# Patient Record
Sex: FEMALE | Race: OTHER | Employment: UNEMPLOYED | URBAN - METROPOLITAN AREA
[De-identification: names, ages, dates, MRNs, and addresses within clinical notes are randomized per-mention and may not be internally consistent; named-entity substitution may affect disease eponyms.]

---

## 2017-08-23 ENCOUNTER — OFFICE VISIT (OUTPATIENT)
Dept: FAMILY MEDICINE CLINIC | Age: 27
End: 2017-08-23

## 2017-08-23 VITALS
SYSTOLIC BLOOD PRESSURE: 109 MMHG | HEIGHT: 63 IN | OXYGEN SATURATION: 97 % | HEART RATE: 89 BPM | WEIGHT: 179 LBS | BODY MASS INDEX: 31.71 KG/M2 | TEMPERATURE: 98.9 F | DIASTOLIC BLOOD PRESSURE: 72 MMHG

## 2017-08-23 DIAGNOSIS — R05.8 DRY COUGH: Primary | ICD-10-CM

## 2017-08-23 RX ORDER — BENZONATATE 100 MG/1
100 CAPSULE ORAL
Qty: 15 CAP | Refills: 0
Start: 2017-08-23 | End: 2018-01-29

## 2017-08-23 RX ORDER — ALBUTEROL SULFATE 0.83 MG/ML
2.5 SOLUTION RESPIRATORY (INHALATION) ONCE
Qty: 1 EACH | Refills: 0
Start: 2017-08-23 | End: 2017-08-23

## 2017-08-23 NOTE — MR AVS SNAPSHOT
Visit Information Date & Time Provider Department Dept. Phone Encounter #  
 8/23/2017  1:30 PM Cb Fairbanks NP AN-ST [facility] [phone] [id number] Upcoming Health Maintenance Date Due  
 HPV AGE 9Y-34Y (1 of 3 - Female 3 Dose Series) 9/21/2002 DTaP/Tdap/Td series (1 - Tdap) 9/21/2012 PAP AKA CERVICAL CYTOLOGY 9/21/2012 INFLUENZA AGE 9 TO ADULT 8/1/2017 Allergies as of 8/23/2017  Review Complete On: 8/23/2017 By: Cb Fairbanks NP No Known Allergies Current Immunizations  Never Reviewed Name Date Influenza Vaccine (Quad) PF 12/15/2014 Not reviewed this visit You Were Diagnosed With   
  
 Codes Comments Dry cough    -  Primary ICD-10-CM: Q93 ICD-9-CM: 169. 2 Vitals BP Pulse Temp Height(growth percentile) Weight(growth percentile) LMP  
 109/72 (BP 1 Location: Right arm, BP Patient Position: Sitting) 89 98.9 °F (37.2 °C) (Oral) 5' 2.6\" (1.59 m) 179 lb (81.2 kg) 08/10/2017 SpO2 Breastfeeding? BMI OB Status Smoking Status 97% No 32.12 kg/m2 Having regular periods Never Smoker Vitals History BMI and BSA Data Body Mass Index Body Surface Area  
 32.12 kg/m 2 1.89 m 2 Preferred Pharmacy Pharmacy Name Phone [facility] [address] , [address] [place] [phone] Your Updated Medication List  
  
   
This list is accurate as of: 8/23/17  4:16 PM.  Always use your most recent med list.  
  
  
  
  
 albuterol 2.5 mg /3 mL (0.083 %) nebulizer solution Commonly known as:  PROVENTIL VENTOLIN  
3 mL by Nebulization route once for 1 dose. folic acid 1 mg tablet Commonly known as:  Lacy Fieldton Take 1 Tab by mouth daily. We Performed the Following ALBUTEROL, INHAL. SOL., FDA-APPROVED FINAL, NON-COMPOUND UNIT DOSE, 1 MG [ [facility]] INHAL RX, AIRWAY OBST/DX SPUTUM INDUCT [id number] CPT(R)] Introducing [facility] & HEALTH SERVICES! Dear Mendel Marianna: Thank you for requesting a MyQuoteApp account. Our records indicate that you already have an active MyQuoteApp account. You can access your account anytime at [email or web address] Did you know that you can access your hospital and ER discharge instructions at any time in MyQuoteApp? You can also review all of your test results from your hospital stay or ER visit. Additional Information If you have questions, please visit the Frequently Asked Questions section of the MyQuoteApp website at [email or web address] Remember, MyQuoteApp is NOT to be used for urgent needs. For medical emergencies, dial 911. Now available from your iPhone and Android! Please provide this summary of care documentation to your next provider. If you have any questions after today's visit, please call [phone].

## 2017-08-23 NOTE — PROGRESS NOTES
Coordination of Care  1. Have you been to the ER, urgent care clinic since your last visit? Hospitalized since your last visit? No    2. Have you seen or consulted any other health care providers outside of the [address] [place] [address] since your last visit? Include any pap smears or colon screening. No    Medications  Medication Reconciliation Performed: no  Patient does not need refills     Learning Assessment Complete?  yes

## 2017-08-23 NOTE — PROGRESS NOTES
As ordered, patient given one albuterol neb breathing treatment. Pre-neb RA O2 sat was 99%, HR 76 and lungs CTA in all fields. Post-neb RA O2 sat was 100%and HR 99. The patient stated she felt the same after the treatment. She did not cough at all while in the discharge room. Patient tolerated the treatment without difficulty.  Doe Ch RN

## 2017-08-23 NOTE — PROGRESS NOTES
Assessment/Plan:       ICD-10-CM ICD-9-CM    1. Dry cough R05 786.2 albuterol (PROVENTIL VENTOLIN) 2.5 mg /3 mL (0.083 %) nebulizer solution      ALBUTEROL, INHAL. SOL., FDA-APPROVED FINAL, NON-COMPOUND UNIT DOSE, 1 MG      INHAL RX, AIRWAY OBST/DX SPUTUM INDUCT      benzonatate (TESSALON) 100 mg capsule       [address] [facility]  Phone: [phone] Fax: [phone]    [facility] [address], [address] [facility]  [address] [facility]  [address] [place] [address]  Phone: [phone] Fax: [phone]    [facility], 1200 [facility]  [address] [address]  Phone: [phone] Fax: [phone]      [facility]  Subjective:     Chief Complaint   Patient presents with    Cough     x 1 month white phlem more at night     Loren Carpio is a 23 y.o. OTHER female. HPI: No history of asthma. The cough is worse at night. She  has no past medical history on file. She  does not have any active problems on file. Review of Systems: Negative for: fever, chest pain, shortness of breath, leg swelling, exertional dyspnea, palpitations. Current Medications:   Current Outpatient Prescriptions on File Prior to Visit   Medication Sig    folic acid (FOLVITE) 1 mg tablet Take 1 Tab by mouth daily. No current facility-administered medications on file prior to visit. Past Surgical History: She  has no past surgical history on file. Social and Family History: She  reports that she has never smoked. She has never used smokeless tobacco. She reports that she does not drink alcohol or use illicit drugs. ; family history is not on file.   Objective:     Vitals:    08/23/17 1340   BP: 109/72   Pulse: 89   Temp: 98.9 °F (37.2 °C)   TempSrc: Oral   SpO2: 97%   Weight: 179 lb (81.2 kg)   Height: 5' 2.6\" (1.59 m)    Patient's last menstrual period was 08/10/2017. Wt Readings from Last 2 Encounters:   08/23/17 179 lb (81.2 kg)   03/14/16 177 lb (80.3 kg)     No results found for any visits on 08/23/17. Physical Examination:  General appearance - well developed, no acute distress. Chest - clear to auscultation. Heart - regular rate and rhythm without murmurs, rubs, or gallops. Abdomen - bowel sounds present x 4, NT, ND. Extremities - pulses intact. No peripheral edema. Assessment/Plan:   Diagnoses and all orders for this visit:    1. Dry cough  -     albuterol (PROVENTIL VENTOLIN) 2.5 mg /3 mL (0.083 %) nebulizer solution; 3 mL by Nebulization route once for 1 dose. -     ALBUTEROL, INHAL. OII.()  -     INHAL RX, AIRWAY OBST/DX SPUTUM INDUCT ([id number])  -     benzonatate (TESSALON) 100 mg capsule; Take 1 Cap by mouth nightly as needed for Cough. Reports no benefit from nebulizer treatment. Will provide tessalon perles for cough, return if not improving. Follow-up Disposition:  Return if symptoms worsen or fail to improve. Thierno Dallas, MARIAH, FNP-BC, BC-ADM  Loren Olmos expressed understanding of this plan.

## 2018-01-29 ENCOUNTER — OFFICE VISIT (OUTPATIENT)
Dept: FAMILY MEDICINE CLINIC | Age: 28
End: 2018-01-29

## 2018-01-29 VITALS
BODY MASS INDEX: 33.13 KG/M2 | WEIGHT: 187 LBS | HEART RATE: 65 BPM | DIASTOLIC BLOOD PRESSURE: 72 MMHG | HEIGHT: 63 IN | TEMPERATURE: 97.8 F | SYSTOLIC BLOOD PRESSURE: 113 MMHG

## 2018-01-29 DIAGNOSIS — R68.89 FLU-LIKE SYMPTOMS: ICD-10-CM

## 2018-01-29 DIAGNOSIS — Z13.9 ENCOUNTER FOR SCREENING: Primary | ICD-10-CM

## 2018-01-29 LAB — HGB BLD-MCNC: 13.6 G/DL

## 2018-01-29 NOTE — PROGRESS NOTES
Assessment/Plan:    Diagnoses and all orders for this visit:    1. Encounter for screening  -     AMB POC HEMOGLOBIN (HGB)    2. Flu-like symptoms    IUD in place with normal period 1/4/18  No post partum depression sxs  Rest, fluids, tylenol or advil as needed   Flu precautions    Follow-up Disposition:  Return if symptoms worsen or fail to improve. PELON Brock expressed understanding of this plan. An AVS was printed and given to the patient.      ----------------------------------------------------------------------    Chief Complaint   Patient presents with    Head Pain     pt c/o headaches, dizziness, nausea and feeling tired x8 days       History of Present Illness:  Pt here for 1 week of flu like symptoms, body aches, ? Fever (subjective), fatigue, reduced appetite. She does not have n/v/d. She does not have cough currently. She has IUD. She had normal period this month. She has 3 children 5 and under. 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 She denies any sxs of depression- no feelings of sadness, no trouble getting out of bed in the morning, and she does feel supported by her spouse and friends. She was concerned that maybe she was anemic due to the fatigue but her hemoglobin was normal      No past medical history on file. No Known Allergies    Social History   Substance Use Topics    Smoking status: Never Smoker    Smokeless tobacco: Never Used    Alcohol use No       No family history on file.     Physical Exam:     Visit Vitals    /72 (BP 1 Location: Right arm)    Pulse 65    Temp 97.8 °F (36.6 °C) (Oral)    Ht 5' 2.6\" (1.59 m)    Wt 187 lb (84.8 kg)    LMP 01/04/2018    BMI 33.55 kg/m2     gen smiling, looks well  A&Ox3  WDWN NAD  Respirations normal and non labored  Lungs are CTA kelley  HEENT- no acute findings, TM's patent   Lab Results   Component Value Date/Time    Hemoglobin (POC) 13.6 01/29/2018 09:31 AM

## 2018-01-29 NOTE — MR AVS SNAPSHOT
[address] [place] [address] 
[phone] Patient: Annel Pina MRN: [id number] GBS:[birth year] Visit Information Date & Time Provider Department Dept. Phone Encounter #  
 1/29/2018  9:30 AM Camryn  [facility], [address] [facility] [phone] [id number] Follow-up Instructions Return if symptoms worsen or fail to improve. Upcoming Health Maintenance Date Due  
 HPV AGE 9Y-34Y (1 of 3 - Female 3 Dose Series) 9/21/2002 DTaP/Tdap/Td series (1 - Tdap) 9/21/2012 PAP AKA CERVICAL CYTOLOGY 9/21/2012 Influenza Age 5 to Adult 8/1/2017 Allergies as of 1/29/2018  Review Complete On: 1/29/2018 By: Brendan Gaucher No Known Allergies Current Immunizations  Never Reviewed Name Date Influenza Vaccine (Quad) PF 12/15/2014 Not reviewed this visit You Were Diagnosed With   
  
 Codes Comments Encounter for screening    -  Primary ICD-10-CM: Z13.9 ICD-9-CM: V82.9 Flu-like symptoms     ICD-10-CM: R68.89 ICD-9-CM: 780.99 Vitals BP Pulse Temp Height(growth percentile) Weight(growth percentile) LMP  
 113/72 (BP 1 Location: Right arm) 65 97.8 °F (36.6 °C) (Oral) 5' 2.6\" (1.59 m) 187 lb (84.8 kg) 01/04/2018 BMI OB Status Smoking Status 33.55 kg/m2 Having regular periods Never Smoker Vitals History BMI and BSA Data Body Mass Index Body Surface Area  
 33.55 kg/m 2 1.94 m 2 Preferred Pharmacy Pharmacy Name Phone Svetlana Kaiser [address] , 1200 [facility] [phone] Your Updated Medication List  
  
Notice  As of 1/29/2018 10:07 AM  
 You have not been prescribed any medications. We Performed the Following AMB POC HEMOGLOBIN (HGB) [96087 CPT(R)] Follow-up Instructions Return if symptoms worsen or fail to improve. Patient Instructions Influenza (Flu): Care Instructions Your Care Instructions Influenza (flu) is an infection in the lungs and breathing passages. It is caused by the influenza virus. There are different strains, or types, of the flu virus from year to year. Unlike the common cold, the flu comes on suddenly and the symptoms, such as a cough, congestion, fever, chills, fatigue, aches, and pains, are more severe. These symptoms may last up to 10 days. Although the flu can make you feel very sick, it usually doesn't cause serious health problems. Home treatment is usually all you need for flu symptoms. But your doctor may prescribe antiviral medicine to prevent other health problems, such as pneumonia, from developing. Older people and those who have a long-term health condition, such as lung disease, are most at risk for having pneumonia or other health problems. Follow-up care is a key part of your treatment and safety. Be sure to make and go to all appointments, and call your doctor if you are having problems. It's also a good idea to know your test results and keep a list of the medicines you take. How can you care for yourself at home? · Get plenty of rest. 
· Drink plenty of fluids, enough so that your urine is light yellow or clear like water. If you have kidney, heart, or liver disease and have to limit fluids, talk with your doctor before you increase the amount of fluids you drink. · Take an over-the-counter pain medicine if needed, such as acetaminophen (Tylenol), ibuprofen (Advil, Motrin), or naproxen (Aleve), to relieve fever, headache, and muscle aches. Read and follow all instructions on the label. No one younger than 20 should take aspirin. It has been linked to Reye syndrome, a serious illness. · Do not smoke. Smoking can make the flu worse. If you need help quitting, talk to your doctor about stop-smoking programs and medicines. These can increase your chances of quitting for good. · Breathe moist air from a hot shower or from a sink filled with hot water to help clear a stuffy nose. · Before you use cough and cold medicines, check the label. These medicines may not be safe for young children or for people with certain health problems. · If the skin around your nose and lips becomes sore, put some petroleum jelly on the area. · To ease coughing: ¨ Drink fluids to soothe a scratchy throat. ¨ Suck on cough drops or plain hard candy. ¨ Take an over-the-counter cough medicine that contains dextromethorphan to help you get some sleep. Read and follow all instructions on the label. ¨ Raise your head at night with an extra pillow. This may help you rest if coughing keeps you awake. · Take any prescribed medicine exactly as directed. Call your doctor if you think you are having a problem with your medicine. To avoid spreading the flu · Wash your hands regularly, and keep your hands away from your face. · Stay home from school, work, and other public places until you are feeling better and your fever has been gone for at least 24 hours. The fever needs to have gone away on its own without the help of medicine. · Ask people living with you to talk to their doctors about preventing the flu. They may get antiviral medicine to keep from getting the flu from you. · To prevent the flu in the future, get a flu vaccine every fall. Encourage people living with you to get the vaccine. · Cover your mouth when you cough or sneeze. When should you call for help? Call 911 anytime you think you may need emergency care. For example, call if: 
? · You have severe trouble breathing. ?Call your doctor now or seek immediate medical care if: 
? · You have new or worse trouble breathing. ? · You seem to be getting much sicker. ? · You feel very sleepy or confused. ? · You have a new or higher fever. ? · You get a new rash. ? Watch closely for changes in your health, and be sure to contact your doctor if: 
? · You begin to get better and then get worse. ? · You are not getting better after 1 week. Where can you learn more? Go to http://giovani-joe.info/. Enter O126 in the search box to learn more about \"Influenza (Flu): Care Instructions. \" Current as of: May 12, 2017 Content Version: 11.4 © 0810-8937 [employer]. Care instructions adapted under license by [employer] (which disclaims liability or warranty for this information). If you have questions about a medical condition or this instruction, always ask your healthcare professional. [place] [address] any warranty or liability for your use of this information. Introducing [facility] & HEALTH SERVICES! Dear Anh Shafer: Thank you for requesting a [employer] account. Our records indicate that you already have an active [employer] account. You can access your account anytime at [email or web address] Did you know that you can access your hospital and ER discharge instructions at any time in [employer]? You can also review all of your test results from your hospital stay or ER visit. Additional Information If you have questions, please visit the Frequently Asked Questions section of the [employer] website at [email or web address] Remember, [employer] is NOT to be used for urgent needs. For medical emergencies, dial 911. Now available from your iPhone and Android! Please provide this summary of care documentation to your next provider. If you have any questions after today's visit, please call [phone].

## 2018-01-29 NOTE — PROGRESS NOTES
Coordination of Care  1. Have you been to the ER, urgent care clinic since your last visit? Hospitalized since your last visit? No    2. Have you seen or consulted any other health care providers outside of the [address] [place] [address] since your last visit? Include any pap smears or colon screening. No    Medications  Does the patient need refills?  NO    Learning Assessment Complete? yes  Results for orders placed or performed in visit on 01/29/18   AMB POC HEMOGLOBIN (HGB)   Result Value Ref Range    Hemoglobin (POC) 13.6

## 2018-01-29 NOTE — PATIENT INSTRUCTIONS

## 2018-04-26 ENCOUNTER — HOSPITAL ENCOUNTER (OUTPATIENT)
Dept: LAB | Age: 28
Discharge: HOME | End: 2018-04-26

## 2018-04-26 PROCEDURE — 87591 N.GONORRHOEAE DNA AMP PROB: CPT | Performed by: NURSE PRACTITIONER

## 2018-04-27 LAB
C TRACH DNA SPEC QL NAA+PROBE: NEGATIVE
N GONORRHOEA DNA SPEC QL NAA+PROBE: NEGATIVE
SAMPLE TYPE: NORMAL
SERVICE CMNT-IMP: NORMAL
SPECIMEN SOURCE: NORMAL

## 2018-11-12 ENCOUNTER — OFFICE VISIT (OUTPATIENT)
Dept: FAMILY MEDICINE CLINIC | Age: 28
End: 2018-11-12

## 2018-11-12 VITALS
BODY MASS INDEX: 33.66 KG/M2 | HEART RATE: 81 BPM | DIASTOLIC BLOOD PRESSURE: 78 MMHG | OXYGEN SATURATION: 98 % | SYSTOLIC BLOOD PRESSURE: 128 MMHG | HEIGHT: 63 IN | TEMPERATURE: 98.6 F | WEIGHT: 190 LBS

## 2018-11-12 DIAGNOSIS — J06.9 ACUTE URI: Primary | ICD-10-CM

## 2018-11-12 RX ORDER — GUAIFENESIN 600 MG/1
600 TABLET, EXTENDED RELEASE ORAL 2 TIMES DAILY
Qty: 20 TAB | Refills: 0 | Status: SHIPPED | OUTPATIENT
Start: 2018-11-12 | End: 2020-08-18 | Stop reason: ALTCHOICE

## 2018-11-12 RX ORDER — IBUPROFEN 600 MG/1
600 TABLET ORAL
Qty: 60 TAB | Refills: 0 | Status: SHIPPED | OUTPATIENT
Start: 2018-11-12 | End: 2020-08-18 | Stop reason: ALTCHOICE

## 2018-11-12 RX ORDER — PSEUDOEPHEDRINE HCL 30 MG
30 TABLET ORAL
Qty: 20 TAB | Refills: 1 | Status: SHIPPED | OUTPATIENT
Start: 2018-11-12 | End: 2020-08-18 | Stop reason: ALTCHOICE

## 2018-11-12 NOTE — PROGRESS NOTES
Assessment/Plan:    Diagnoses and all orders for this visit:    1. Acute URI  -     guaiFENesin ER (MUCINEX) 600 mg ER tablet; Take 1 Tab by mouth two (2) times a day. [place] 1 [employer] veces al avila con vaso de agua  -     pseudoephedrine (SUDAFED) 30 mg tablet; Take 1 Tab by mouth every four (4) hours as needed for Congestion. [place] 1 pastilla cada 4 horas  -     ibuprofen (MOTRIN) 600 mg tablet; Take 1 Tab by mouth every six (6) hours as needed for Pain. [place] 1 pastilla cada 6 horas por marin dolor        Follow-up Disposition:  Return if symptoms worsen or fail to improve. PELON Reese expressed understanding of this plan. An AVS was printed and given to the patient.      ----------------------------------------------------------------------    Chief Complaint   Patient presents with    Cough     x 4 days       History of Present Illness:    3 days of cough, coughing so much her abdominal m hurt. No fever. Appetite down a bit. No one else sick at home. No n/v/d. Not taking any meds. Currently. She states that she is \"not pregnant\". We discussed how her sxs are suggestive of viral URI and how this does not respond to abx. She is concerned bc she is not sleeping at night due to her cough. We discussed how she could try tea before bedtime too. No past medical history on file. Current Outpatient Medications   Medication Sig Dispense Refill    guaiFENesin ER (MUCINEX) 600 mg ER tablet Take 1 Tab by mouth two (2) times a day. [place] 1 [employer]Val[employer] veces al avila con vaso de agua 20 Tab 0    pseudoephedrine (SUDAFED) 30 mg tablet Take 1 Tab by mouth every four (4) hours as needed for Congestion. [place] 1 pastilla cada 4 horas 20 Tab 1    ibuprofen (MOTRIN) 600 mg tablet Take 1 Tab by mouth every six (6) hours as needed for Pain.  [place] 1 pastilla cada 6 horas por marin dolor 60 Tab 0       No Known Allergies    Social History     Tobacco Use    Smoking status: Never Smoker    Smokeless tobacco: Never Used   Substance Use Topics    Alcohol use: No     Alcohol/week: 0.0 oz    Drug use: No       No family history on file.     Physical Exam:     Visit Vitals  /78 (BP 1 Location: Left arm, BP Patient Position: Sitting)   Pulse 81   Temp 98.6 °F (37 °C) (Oral)   Ht 5' 3.39\" (1.61 m)   Wt 190 lb (86.2 kg)   LMP 10/08/2018   SpO2 98%   BMI 33.25 kg/m²   pleasant, looks not acute     A&Ox3  WDWN NAD  Respirations normal and non labored  Wearing mask, coughing some during the exam  Lungs are CTA kelley a and p  OP mild posterior injection, no exudate  Neck is supple w/out LAD

## 2018-11-12 NOTE — PATIENT INSTRUCTIONS
Infección de las vías respiratorias altas (Bart Santamaria): Instrucciones de cuidado - [ Upper Respiratory Infection (Cold): Care Instructions ]  Instrucciones de cuidado    La infección de las vías respiratorias altas (o URI, por shagufta siglas en inglés), es gualberto infección de la [place], los senos paranasales o la garganta. Las URI se transmiten por la tos, los estornudos y el contacto directo. El resfriado común es el tipo más frecuente de URI. La gripe y las infecciones de los senos paranasales son otros tipos de URI. Kate todas las URI son causadas por virus. Los antibióticos no las Junius English. Sin embargo, usted puede tratar la mayoría de estas infecciones con cuidados en el hogar. [place] puede implicar beber muchos líquidos y vale analgésicos (medicamentos para el dolor) de venta pattie. Es probable que se sienta mejor al cabo de 4 a 10 días. El médico lo garcia revisado minuciosamente, yaya se pueden presentar problemas más tarde. Si nota algún problema o nuevos síntomas, busque tratamiento médico inmediatamente. La atención de seguimiento es gualberto parte clave de marin tratamiento y seguridad. Asegúrese de hacer y acudir a todas las citas, y llame a marin médico si está teniendo problemas. También es gualberto buena idea saber los resultados de shagufta exámenes y mantener gualberto lista de los medicamentos que mauricio. ¿Cómo puede cuidarse en el [facility]? · Para prevenir la deshidratación, rogelio abundantes líquidos, los suficientes donavan para que marin orina sea de color amarillo aide o transparente donavan el agua. Opte por beber agua y otros líquidos carol sin cafeína hasta que se sienta mejor. Si tiene [employer], del corazón o del hígado y tiene que Ami's líquidos, hable con marin médico antes de aumentar marin consumo. · [place] un analgésico de venta pattie, donavan acetaminofén (Tylenol), ibuprofeno (Advil, Motrin) o naproxeno (Aleve). Zeynep y siga todas las instrucciones de la [place].   · Antes de usar medicamentos para la tos y los resfriados, revise la etiqueta. Estos medicamentos podrían no ser seguros para los niños pequeños o las personas con ciertos problemas de [place]. · Tenga cuidado cuando tome medicamentos de venta pattie para el resfriado común o la gripe y Tylenol al [employer]. Muchos de estos medicamentos contienen acetaminofén, o sea, Tylenol. Zeynep las etiquetas para asegurarse de que no está tomando gualberto dosis mayor que la recomendada. El exceso de acetaminofén (Tylenol) puede ser dañino. · Descanse lo suficiente. · No fume ni permita que otros fumen cerca de usted. Si necesita ayuda para dejar de fumar, hable con marin médico acerca de programas y medicamentos para dejar de fumar. Estos pueden aumentar shagufta probabilidades de dejar el hábito para siempre. ¿Cuándo debe pedir ayuda? Llame al 911 en cualquier momento que considere que necesita atención de [place]. Por ejemplo, llame si:    · Tiene graves dificultades para respirar.    Llame a marin médico ahora mismo o busque atención médica inmediata si:    · Le parece que está mucho más enfermo.     · Tiene nueva o peor dificultad para respirar.     · Tiene fiebre nueva o más miguel ángel.     · Tiene un salpullido nuevo.    Preste especial atención a los cambios en marin renato y asegúrese de comunicarse con marin médico si:    · Tiene síntomas nuevos, donavan dolor de garganta, dolor de oídos o dolor de los senos paranasales.     · Marin tos es más profunda o más frecuente que antes, especialmente si nota más mucosidad o un cambio en el color de la mucosidad.     · No mejora donavan se esperaba. ¿Dónde puede encontrar más información en inglés? Keenan Dk a http://giovani-joe.info/. Wilson Shelton R758 en la búsqueda para aprender más acerca de \"Infección de las vías respiratorias altas ([place]): Instrucciones de cuidado - [ Upper Respiratory Infection (Cold): Care Instructions ]. \"  Revisado: 6 diciembre, 2017  Versión del contenido: 11.8  © 7772-8242 Healthwise, Incorporated.  Kimmy Clements instrucciones de cuidado fueron adaptadas bajo licencia por [facility] (which disclaims liability or warranty for this information). Si usted tiene [place] afección médica o sobre estas instrucciones, siempre pregunte a marin profesional de renato. [facility], [employer]orporated niega toda garantía o responsabilidad por marin uso de esta información.

## 2018-11-12 NOTE — PROGRESS NOTES
Coordination of Care  1. Have you been to the ER, urgent care clinic since your last visit? Hospitalized since your last visit? No    2. Have you seen or consulted any other health care providers outside of the [address] [place] [address] since your last visit? Include any pap smears or colon screening. No    Does the patient need refills? YES    Learning Assessment Complete?  yes

## 2018-11-12 NOTE — PROGRESS NOTES
AVS printed and reviewed. Good Rx available for each e script and printed and reviewed. Reasons to go to an ER discussed. Discussion assisted by CAV , Allison Rivera.

## 2020-08-18 ENCOUNTER — OFFICE VISIT (OUTPATIENT)
Dept: FAMILY MEDICINE CLINIC | Age: 30
End: 2020-08-18
Payer: MEDICAID

## 2020-08-18 VITALS
TEMPERATURE: 97.3 F | DIASTOLIC BLOOD PRESSURE: 78 MMHG | HEART RATE: 69 BPM | OXYGEN SATURATION: 99 % | BODY MASS INDEX: 33.49 KG/M2 | SYSTOLIC BLOOD PRESSURE: 123 MMHG | WEIGHT: 189 LBS | RESPIRATION RATE: 16 BRPM | HEIGHT: 63 IN

## 2020-08-18 DIAGNOSIS — Z76.89 ESTABLISHING CARE WITH NEW DOCTOR, ENCOUNTER FOR: ICD-10-CM

## 2020-08-18 DIAGNOSIS — R53.83 FATIGUE, UNSPECIFIED TYPE: ICD-10-CM

## 2020-08-18 DIAGNOSIS — Z13.1 SCREENING FOR DIABETES MELLITUS: ICD-10-CM

## 2020-08-18 DIAGNOSIS — Z00.00 ANNUAL PHYSICAL EXAM: Primary | ICD-10-CM

## 2020-08-18 DIAGNOSIS — Z13.220 SCREENING CHOLESTEROL LEVEL: ICD-10-CM

## 2020-08-18 PROCEDURE — 99385 PREV VISIT NEW AGE 18-39: CPT | Performed by: FAMILY MEDICINE

## 2020-08-18 NOTE — PROGRESS NOTES
Identified patient with 2 identifiers. Chief Complaint   Patient presents with    New Patient     Establish Care    Fatigue     x 2 weeks     Patient explains she has trouble waking up    1. Have you been to the ER, urgent care clinic since your last visit? Hospitalized since your last visit? No    2. Have you seen or consulted any other health care providers outside of the [address] [place] [address] since your last visit? Include any pap smears or colon screening.  No

## 2020-08-18 NOTE — PROGRESS NOTES
Rico Shaffer is a 30 y.o. female, who's a new patient to our practice. Previous PCP: none    Annual exam    Fatigue for 2 weeks, just sleeps more, hard to wake up. Denies CP, SOB, recent illness. Denies depression, anxiety. Reviewed: active problem list, medication list, allergies, notes from last encounter, lab results    A comprehensive review of systems was negative except for that written in the HPI, on 14 ROS. No Known Allergies  No current outpatient medications on file prior to visit. No current facility-administered medications on file prior to visit. Patient Active Problem List   Diagnosis Code   (none) - all problems resolved or deleted       Visit Vitals  /78 (BP 1 Location: Left arm, BP Patient Position: Sitting)   Pulse 69   Temp 97.3 °F (36.3 °C) (Temporal)   Resp 16   Ht 5' 3\" (1.6 m)   Wt 189 lb (85.7 kg)   SpO2 99%   BMI 33.48 kg/m²     General appearance: alert, cooperative, no distress, appears stated age  Neurologic: Alert and oriented X 3, normal strength and tone, symmetric. Normal without focal findings. Cranial nerves 2-12 intact. Normal coordination and gait. Mental status: Alert, oriented, thought content appropriate, affect: stable, mood-congruent. Head: Normocephalic, without obvious abnormality, atraumatic  Eyes: conjunctivae/corneas clear. PERRL, EOM's intact. Neck: supple, symmetrical, trachea midline, no JVD  Lungs: clear to auscultation bilaterally  Heart: regular rate and rhythm, S1, S2 normal, no murmur, click, rub or gallop  Abdomen: soft, non-tender. Extremities: extremities normal, atraumatic, no cyanosis or edema      Assessment/Plans:    Diagnoses and all orders for this visit:    1. Annual physical exam  -     CBC W/O DIFF  -     HEMOGLOBIN A1C W/O EAG  -     LIPID PANEL  -     METABOLIC PANEL, COMPREHENSIVE  -     TSH 3RD GENERATION  -     URINALYSIS W/ RFLX MICROSCOPIC  -     HIV 1/2 AG/AB, 4TH GENERATION,W RFLX CONFIRM    2. Establishing care with new doctor, encounter for    3. Screening cholesterol level  -     LIPID PANEL    4. Screening for diabetes mellitus  -     HEMOGLOBIN A1C W/O EAG    5. Fatigue, unspecified type  -     CBC W/O DIFF  -     HEMOGLOBIN A1C W/O EAG  -     LIPID PANEL  -     METABOLIC PANEL, COMPREHENSIVE  -     TSH 3RD GENERATION  -     URINALYSIS W/ RFLX MICROSCOPIC  -     HIV 1/2 AG/AB, 4TH GENERATION,W RFLX CONFIRM      Discussed plans, risk/benefits of treatments/observations. Through the use of shared decision making, above plans were agreed upon. Medication compliance advised. Patient verbalized understanding. Follow-up and Dispositions    · Return in about 1 week (around 8/25/2020) for fatigue, labs.          Reynaldo Willoughby MD  8/18/2020

## 2020-08-19 LAB
ALBUMIN SERPL-MCNC: 4.7 G/DL (ref 3.9–5)
ALBUMIN/GLOB SERPL: 1.7 {RATIO} (ref 1.2–2.2)
ALP SERPL-CCNC: 57 IU/L (ref 39–117)
ALT SERPL-CCNC: 33 IU/L (ref 0–32)
APPEARANCE UR: CLEAR
AST SERPL-CCNC: 16 IU/L (ref 0–40)
BACTERIA #/AREA URNS HPF: ABNORMAL /[HPF]
BILIRUB SERPL-MCNC: 0.3 MG/DL (ref 0–1.2)
BILIRUB UR QL STRIP: NEGATIVE
BUN SERPL-MCNC: 12 MG/DL (ref 6–20)
BUN/CREAT SERPL: 17 (ref 9–23)
CALCIUM SERPL-MCNC: 8.9 MG/DL (ref 8.7–10.2)
CASTS URNS QL MICRO: ABNORMAL /LPF
CHLORIDE SERPL-SCNC: 104 MMOL/L (ref 96–106)
CHOLEST SERPL-MCNC: 171 MG/DL (ref 100–199)
CO2 SERPL-SCNC: 22 MMOL/L (ref 20–29)
COLOR UR: YELLOW
CREAT SERPL-MCNC: 0.69 MG/DL (ref 0.57–1)
EPI CELLS #/AREA URNS HPF: ABNORMAL /HPF (ref 0–10)
ERYTHROCYTE [DISTWIDTH] IN BLOOD BY AUTOMATED COUNT: 12.9 % (ref 11.7–15.4)
GLOBULIN SER CALC-MCNC: 2.7 G/DL (ref 1.5–4.5)
GLUCOSE SERPL-MCNC: 90 MG/DL (ref 65–99)
GLUCOSE UR QL: NEGATIVE
HBA1C MFR BLD: 5.2 % (ref 4.8–5.6)
HCT VFR BLD AUTO: 40.9 % (ref 34–46.6)
HDLC SERPL-MCNC: 38 MG/DL
HGB BLD-MCNC: 13.6 G/DL (ref 11.1–15.9)
HGB UR QL STRIP: ABNORMAL
HIV 1+2 AB+HIV1 P24 AG SERPL QL IA: NON REACTIVE
KETONES UR QL STRIP: NEGATIVE
LDLC SERPL CALC-MCNC: 101 MG/DL (ref 0–99)
LEUKOCYTE ESTERASE UR QL STRIP: ABNORMAL
MCH RBC QN AUTO: 27.8 PG (ref 26.6–33)
MCHC RBC AUTO-ENTMCNC: 33.3 G/DL (ref 31.5–35.7)
MCV RBC AUTO: 84 FL (ref 79–97)
MICRO URNS: ABNORMAL
MUCOUS THREADS URNS QL MICRO: PRESENT
NITRITE UR QL STRIP: NEGATIVE
PH UR STRIP: 5.5 [PH] (ref 5–7.5)
PLATELET # BLD AUTO: 215 X10E3/UL (ref 150–450)
POTASSIUM SERPL-SCNC: 4 MMOL/L (ref 3.5–5.2)
PROT SERPL-MCNC: 7.4 G/DL (ref 6–8.5)
PROT UR QL STRIP: NEGATIVE
RBC # BLD AUTO: 4.89 X10E6/UL (ref 3.77–5.28)
RBC #/AREA URNS HPF: ABNORMAL /HPF (ref 0–2)
SODIUM SERPL-SCNC: 139 MMOL/L (ref 134–144)
SP GR UR: 1.01 (ref 1–1.03)
TRIGL SERPL-MCNC: 159 MG/DL (ref 0–149)
TSH SERPL DL<=0.005 MIU/L-ACNC: 2.98 UIU/ML (ref 0.45–4.5)
UROBILINOGEN UR STRIP-MCNC: 0.2 MG/DL (ref 0.2–1)
VLDLC SERPL CALC-MCNC: 32 MG/DL (ref 5–40)
WBC # BLD AUTO: 6.9 X10E3/UL (ref 3.4–10.8)
WBC #/AREA URNS HPF: ABNORMAL /HPF (ref 0–5)

## 2020-08-26 ENCOUNTER — VIRTUAL VISIT (OUTPATIENT)
Dept: FAMILY MEDICINE CLINIC | Age: 30
End: 2020-08-26
Payer: MEDICAID

## 2020-08-26 DIAGNOSIS — Z71.3 WEIGHT LOSS COUNSELING, ENCOUNTER FOR: Primary | ICD-10-CM

## 2020-08-26 DIAGNOSIS — R53.83 FATIGUE, UNSPECIFIED TYPE: ICD-10-CM

## 2020-08-26 PROCEDURE — 99213 OFFICE O/P EST LOW 20 MIN: CPT | Performed by: FAMILY MEDICINE

## 2020-08-26 RX ORDER — TOPIRAMATE 25 MG/1
25 TABLET ORAL 2 TIMES DAILY
Qty: 60 TAB | Refills: 1 | Status: SHIPPED | OUTPATIENT
Start: 2020-08-26

## 2020-08-26 NOTE — PROGRESS NOTES
Consent: Nathan Gillespie, who was seen by synchronous (real-time) audio-video technology, and/or her healthcare decision maker, is aware that this patient-initiated, Telehealth encounter on 8/26/2020 is a billable service, with coverage as determined by her insurance carrier. She is aware that she may receive a bill and has provided verbal consent to proceed: Yes. Nathan Gillespie is a 30 y.o. female    2nd visit with this physician,     Patient's last menstrual period was 08/14/2020. Have IUD      Labs reviewed with pt. Chronic fatigue, feels much better now. Sleep is better. Mood is hesitantly \"good\". BMI 33  Weight loss counseling  Discussed calories intake. Exercise  Aim for 1500cal/day  We'll start topamax 25mg bid. Reviewed: active problem list, medication list, allergies, notes from last encounter, lab results    A comprehensive review of systems was negative except for that written in the HPI. Assessment & Plan:   Diagnoses and all orders for this visit:    1. Weight loss counseling, encounter for  -     topiramate (TOPAMAX) 25 mg tablet; Take 1 Tab by mouth two (2) times a day. 2. BMI 33.0-33.9,adult  -     topiramate (TOPAMAX) 25 mg tablet; Take 1 Tab by mouth two (2) times a day. 3. Fatigue, unspecified type        Follow-up and Dispositions    · Return in about 2 months (around 10/26/2020) for weight loss, meds refill. I spent at least 15 minutes with this established patient, and >50% of the time was spent counseling and/or coordinating care regarding weight loss counseling, labs review, chronic fatigue  712  Subjective:   Nathan Gillespie is a 30 y.o. female who was seen for Results      Prior to Admission medications    Medication Sig Start Date End Date Taking? Authorizing Provider   levonorgestreL (MIRENA) 20 mcg/24 hours (5 yrs) 52 mg IUD 1 Device by IntraUTERine route once.    Yes Provider, Historical   topiramate (TOPAMAX) 25 mg tablet Take 1 Tab by mouth two (2) times a day. 8/26/20  Yes Radha Gray MD     No Known Allergies    There are no active problems to display for this patient. History reviewed. No pertinent past medical history. History reviewed. No pertinent surgical history. Objective:   Vital Signs: (As obtained by patient/caregiver at home)  Visit Vitals  LMP 08/14/2020        Constitutional: [x] Appears well-developed and well-nourished [x] No apparent distress      [] Abnormal -     Mental status: [x] Alert and awake  [x] Oriented to person/place/time [x] Able to follow commands    [] Abnormal -     Eyes:   EOM    [x]  Normal    [] Abnormal -   Sclera  [x]  Normal    [] Abnormal -          Discharge [x]  None visible   [] Abnormal -     HENT: [x] Normocephalic, atraumatic  [] Abnormal -   [] Mouth/Throat: Mucous membranes are moist    External Ears [x] Normal  [] Abnormal -    Neck: [x] No visualized mass [] Abnormal -     Pulmonary/Chest: [x] Respiratory effort normal   [x] No visualized signs of difficulty breathing or respiratory distress        [] Abnormal -      Musculoskeletal:   [x] Normal gait with no signs of ataxia         [x] Normal range of motion of neck        [] Abnormal -     Neurological:        [x] No Facial Asymmetry (Cranial nerve 7 motor function) (limited exam due to video visit)          [x] No gaze palsy        [] Abnormal -          Skin:        [x] No significant exanthematous lesions or discoloration noted on facial skin         [] Abnormal -            Psychiatric:       [x] Normal Affect [] Abnormal -        [x] No Hallucinations      We discussed the expected course, resolution and complications of the diagnosis(es) in detail. Medication risks, benefits, costs, interactions, and alternatives were discussed as indicated. I advised her to contact the office if her condition worsens, changes or fails to improve as anticipated. She expressed understanding with the diagnosis(es) and plan.        Saul Zheng is a 30 y.o. female being evaluated by a video visit encounter for concerns as above. A caregiver was present when appropriate. Due to this being a TeleHealth encounter (During TIHPX-37 public health emergency), evaluation of the following organ systems was limited: Vitals/Constitutional/EENT/Resp/CV/GI//MS/Neuro/Skin/Heme-Lymph-Imm. Pursuant to the emergency declaration under the [address] [place] [address], [facility] waiver authority and the [employer] Act, this Virtual  Visit was conducted, with patient's (and/or legal guardian's) consent, to reduce the patient's risk of exposure to COVID-19 and provide necessary medical care. Services were provided through a video synchronous discussion virtually to substitute for in-person clinic visit. Patient and provider were located at their individual homes.         Josselin White MD

## 2020-08-26 NOTE — PROGRESS NOTES
Chief Complaint   Patient presents with    Results       1. Have you been to the ER, urgent care clinic since your last visit? Hospitalized since your last visit? no    2. Have you seen or consulted any other health care providers outside of the [address] [place] [address] since your last visit? Include any pap smears or colon screening.  no

## 2022-02-17 ENCOUNTER — OFFICE VISIT (OUTPATIENT)
Dept: ORTHOPEDIC SURGERY | Age: 32
End: 2022-02-17
Payer: MEDICAID

## 2022-02-17 DIAGNOSIS — S60.451A FOREIGN BODY OF LEFT INDEX FINGER: Primary | ICD-10-CM

## 2022-02-17 DIAGNOSIS — M79.645 FINGER PAIN, LEFT: ICD-10-CM

## 2022-02-17 PROCEDURE — 99203 OFFICE O/P NEW LOW 30 MIN: CPT | Performed by: PHYSICIAN ASSISTANT

## 2022-02-18 NOTE — PROGRESS NOTES
HPI: Soledad Casey (: 1990) is a 28 y.o. female, patient, here for evaluation of the following chief complaint(s): Patient presents with complaint of discomfort at the tuft of the left index finger. 2 weeks ago, she was sharpening a knife and slipped sustaining a laceration to the left index finger. 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 She was not experiencing any dysfunction. She presented to [facility] for evaluation. X-rays of the left hand were obtained and a foreign body was observed in the left index finger tip. She was referred to us for further evaluation. No other complaints or concerns. X-rays of the left hand imported into PACS. Finger Pain (Left index)       Vitals:  Ht 5' 4\" (1.626 m)   Wt 183 lb (83 kg)   BMI 31.41 kg/m²    Body mass index is 31.41 kg/m². No Known Allergies    Current Outpatient Medications   Medication Sig    levonorgestreL (MIRENA) 20 mcg/24 hours (5 yrs) 52 mg IUD 1 Device by IntraUTERine route once.  topiramate (TOPAMAX) 25 mg tablet Take 1 Tab by mouth two (2) times a day. (Patient not taking: Reported on 2022)     No current facility-administered medications for this visit. No past medical history on file. No past surgical history on file. Family History   Problem Relation Age of Onset    Hypertension Mother         Social History     Tobacco Use    Smoking status: Never Smoker    Smokeless tobacco: Never Used   Substance Use Topics    Alcohol use: No     Alcohol/week: 0.0 standard drinks    Drug use: No        Review of Systems    Constitutional: No fevers, chills, night sweats, excessive fatigue or weight loss. Musculoskeletal: No joint pain, swelling or redness. No decreased range of motion. Neurologic: No headache, blurred vision, and no areas of focal weakness or numbness. Normal gait. No sensory problems.   Respiratory: No dyspnea on exertion, orthopnea, chest pain, cough or hemoptysis. Cardiovascular: No anginal chest pain, irregular heart beat, tachycardia, palpitations or orthopnea  Integumentary: No chronic rashes, inflammation, ulcerations, pruritus, petechiae, purpura, ecchymoses, or skin changes           Physical Exam    General: Alert, cooperative, no distress  Musculosketal: Left hand - Left index finger fingertip is splinted. Healing laceration at the ulnar aspect of the index finger. Full range of motion. Normal sensation. Neurologic:  CNII-XII intact, Normal strength, sensation, and reflexes throughout    Imaging:  Images from [facility] reveal a foreign body at the left index finger distal phalanx. ASSESSMENT/PLAN:  Below is the assessment and plan developed based on review of pertinent history, physical exam, labs, studies, and medications. Discomfort at the tuft of the left index finger. 2 weeks ago, she was sharpening a knife and slipped sustaining a laceration to the finger. 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 She was not experiencing any dysfunction. She presented to [facility] for evaluation. X-rays of the left hand were obtained and a foreign body was observed in the left index finger tip. She is a good candidate for foreign body removal form the left index finger. She would like to schedule this. I reviewed risks that include but are not limited to stiffness, pain, nerve or tendon damage and overall incomplete relief of pain. Arrangements can be made for this to be performed on an outpatient basis at her convenience. 1. Foreign body of left index finger  -     REFERRAL TO SURGERY  2. Finger pain, left      Return in about 4 weeks (around 3/17/2022). Dr. Melissa Soriano was available for immediate consult during this encounter. An electronic signature was used to authenticate this note.   -- Zechariah Jeronimo PA-C

## 2022-02-21 VITALS — WEIGHT: 183 LBS | HEIGHT: 64 IN | BODY MASS INDEX: 31.24 KG/M2

## 2022-02-21 DIAGNOSIS — S60.451A FOREIGN BODY OF LEFT INDEX FINGER: Primary | ICD-10-CM

## 2022-02-21 PROBLEM — M79.645 FINGER PAIN, LEFT: Status: ACTIVE | Noted: 2022-02-21

## 2022-02-21 RX ORDER — HYDROCODONE BITARTRATE AND ACETAMINOPHEN 5; 325 MG/1; MG/1
1 TABLET ORAL
Qty: 15 TABLET | Refills: 0 | Status: SHIPPED | OUTPATIENT
Start: 2022-02-21 | End: 2022-02-24

## 2022-03-02 ENCOUNTER — OFFICE VISIT (OUTPATIENT)
Dept: ORTHOPEDIC SURGERY | Age: 32
End: 2022-03-02
Payer: MEDICAID

## 2022-03-02 DIAGNOSIS — M79.645 FINGER PAIN, LEFT: ICD-10-CM

## 2022-03-02 DIAGNOSIS — S60.451A FOREIGN BODY OF LEFT INDEX FINGER: Primary | ICD-10-CM

## 2022-03-02 PROCEDURE — 99024 POSTOP FOLLOW-UP VISIT: CPT | Performed by: ORTHOPAEDIC SURGERY

## 2022-03-02 NOTE — LETTER
3/2/2022    Patient: Lynnette Sheriff   YOB: 1990   Date of Visit: 3/2/2022     Tyler Erwin MD  [address] [address] [address]  [place]  [facility] [facility] [address]    Dear Tyler Erwin MD,      Thank you for referring Ms. Kristin Chandra to [facility] for evaluation. My notes for this consultation are attached. If you have questions, please do not hesitate to call me. I look forward to following your patient along with you.       Sincerely,    Trish Ashley MD

## 2022-03-02 NOTE — PROGRESS NOTES
HPI: Madelaine Bernardo (: 1990) is a 28 y.o. female, patient, here for evaluation of the following chief complaint(s): Patient presents with complaint of discomfort at the tuft of the left index finger. 2 weeks ago, she was sharpening a knife and slipped sustaining a laceration to the left index finger. 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 She was not experiencing any dysfunction. She presented to [facility] for evaluation. X-rays of the left hand were obtained and a foreign body was observed in the left index finger tip. She underwent removal of the foreign body left index finger on 2022. No chief complaint on file. Vitals: There were no vitals taken for this visit. There is no height or weight on file to calculate BMI. No Known Allergies    Current Outpatient Medications   Medication Sig    levonorgestreL (MIRENA) 20 mcg/24 hours (5 yrs) 52 mg IUD 1 Device by IntraUTERine route once.  topiramate (TOPAMAX) 25 mg tablet Take 1 Tab by mouth two (2) times a day. (Patient not taking: Reported on 2022)     No current facility-administered medications for this visit. History reviewed. No pertinent past medical history. History reviewed. No pertinent surgical history. Family History   Problem Relation Age of Onset    Hypertension Mother         Social History     Tobacco Use    Smoking status: Never Smoker    Smokeless tobacco: Never Used   Substance Use Topics    Alcohol use: No     Alcohol/week: 0.0 standard drinks    Drug use: No        Review of Systems   All other systems reviewed and are negative. Constitutional: No fevers, chills, night sweats, excessive fatigue or weight loss. Musculoskeletal: No joint pain, swelling or redness. No decreased range of motion. Neurologic: No headache, blurred vision, and no areas of focal weakness or numbness. Normal gait.  No sensory problems. Respiratory: No dyspnea on exertion, orthopnea, chest pain, cough or hemoptysis. Cardiovascular: No anginal chest pain, irregular heart beat, tachycardia, palpitations or orthopnea  Integumentary: No chronic rashes, inflammation, ulcerations, pruritus, petechiae, purpura, ecchymoses, or skin changes           Physical Exam    General: Alert, cooperative, no distress  Musculosketal: Left hand - Left index finger fingertip is healing well with no redness drainage or sign of infection. Full range of motion. Normal sensation. Neurologic:  CNII-XII intact, Normal strength, sensation, and reflexes throughout    Imaging:  Images from [facility] reveal a foreign body at the left index finger distal phalanx. ASSESSMENT/PLAN:  Below is the assessment and plan developed based on review of pertinent history, physical exam, labs, studies, and medications. Discomfort at the tuft of the left index finger. She was sharpening a knife and slipped sustaining a laceration to the finger. 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 She was not experiencing any dysfunction. She presented to [facility] for evaluation. X-rays of the left hand were obtained and a foreign body was observed in the left index finger tip. She underwent removal of the foreign body on 2/22/2022. I recommended simple wound care with gentle motion and strength. Overall there is no redness or sign of infection and minimal if any significant sensitivity. She is pleased with her recovery. She plans to continue with home exercises and treatment returning anytime for further care. 1. Foreign body of left index finger  2. Finger pain, left      Return if symptoms worsen or fail to improve.

## 2022-03-18 PROBLEM — M79.645 FINGER PAIN, LEFT: Status: ACTIVE | Noted: 2022-02-21

## 2022-12-09 ENCOUNTER — OFFICE VISIT (OUTPATIENT)
Dept: URGENT CARE | Age: 32
End: 2022-12-09
Payer: MEDICAID

## 2022-12-09 ENCOUNTER — TELEPHONE (OUTPATIENT)
Dept: FAMILY MEDICINE CLINIC | Age: 32
End: 2022-12-09

## 2022-12-09 VITALS
BODY MASS INDEX: 31.76 KG/M2 | DIASTOLIC BLOOD PRESSURE: 84 MMHG | HEART RATE: 74 BPM | RESPIRATION RATE: 16 BRPM | WEIGHT: 186 LBS | SYSTOLIC BLOOD PRESSURE: 124 MMHG | TEMPERATURE: 97.7 F | OXYGEN SATURATION: 100 % | HEIGHT: 64 IN

## 2022-12-09 DIAGNOSIS — R10.32 LEFT GROIN PAIN: Primary | ICD-10-CM

## 2022-12-09 DIAGNOSIS — R82.90 ABNORMAL URINALYSIS: ICD-10-CM

## 2022-12-09 LAB
BILIRUB UR QL STRIP: NEGATIVE
GLUCOSE UR-MCNC: NEGATIVE MG/DL
HCG URINE, QL. (POC): NEGATIVE
KETONES P FAST UR STRIP-MCNC: NEGATIVE MG/DL
PH UR STRIP: 5.5 [PH] (ref 4.6–8)
PROT UR QL STRIP: NEGATIVE
SP GR UR STRIP: 1.02 (ref 1–1.03)
UA UROBILINOGEN AMB POC: ABNORMAL (ref 0.2–1)
URINALYSIS CLARITY POC: CLEAR
URINALYSIS COLOR POC: YELLOW
URINE BLOOD POC: NEGATIVE
URINE LEUKOCYTES POC: ABNORMAL
URINE NITRITES POC: NEGATIVE
VALID INTERNAL CONTROL?: YES

## 2022-12-09 NOTE — TELEPHONE ENCOUNTER
----- Message from Radha Choi sent at 12/9/2022  8:31 AM EST -----  Subject: Message to Provider    QUESTIONS  Information for Provider? Patient called in regarding pain in left lower   abdomen and back pain. She accepted walk in information but would like   someone from the practice to call her to schedule the next available appt. prefers AM.   ---------------------------------------------------------------------------  --------------  Lo Mireles [facility]  [phone]; OK to leave message on voicemail  ---------------------------------------------------------------------------  --------------  SCRIPT ANSWERS  Relationship to Patient?  Self

## 2022-12-09 NOTE — PROGRESS NOTES
Here for left lower groin pain  Onset 3 days ago without injury  Sharp  Worse with movement and bowel movement. No fever, chills, SOB  BMs daily occasional constipation but denies this recently  Feels well otherwise  Hx of ovarian cyst in past  No hx of kidney stones           History reviewed. No pertinent past medical history. History reviewed. No pertinent surgical history. Family History   Problem Relation Age of Onset    Hypertension Mother         Social History     Socioeconomic History    Marital status:      Spouse name: Not on file    Number of children: Not on file    Years of education: Not on file    Highest education level: Not on file   Occupational History    Not on file   Tobacco Use    Smoking status: Never    Smokeless tobacco: Never   Substance and Sexual Activity    Alcohol use: No     Alcohol/week: 0.0 standard drinks    Drug use: No    Sexual activity: Yes     Partners: Male     Birth control/protection: I.U.D. Other Topics Concern    Not on file   Social History Narrative    Not on file     Social Determinants of Health     Financial Resource Strain: Not on file   Food Insecurity: Not on file   Transportation Needs: Not on file   Physical Activity: Not on file   Stress: Not on file   Social Connections: Not on file   Intimate Partner Violence: Not on file   Housing Stability: Not on file                ALLERGIES: Patient has no known allergies. Review of Systems   Constitutional:  Negative for fever. Gastrointestinal:  Negative for abdominal pain, diarrhea, nausea and vomiting. Genitourinary:  Negative for difficulty urinating, flank pain, frequency, hematuria, menstrual problem, vaginal bleeding and vaginal discharge. Skin:  Negative for rash. Neurological:  Negative for dizziness. All other systems reviewed and are negative.     Vitals:    12/09/22 0952   BP: 124/84   Pulse: 74   Resp: 16   Temp: 97.7 °F (36.5 °C)   SpO2: 100%   Weight: 186 lb (84.4 kg) Height: 5' 4\" (1.626 m)       Physical Exam  Vitals reviewed. Constitutional:       General: She is not in acute distress. Appearance: Normal appearance. She is not ill-appearing or toxic-appearing. Eyes:      Extraocular Movements: Extraocular movements intact. Cardiovascular:      Rate and Rhythm: Normal rate and regular rhythm. Pulmonary:      Effort: Pulmonary effort is normal.      Breath sounds: Normal breath sounds. Abdominal:      General: Abdomen is flat. There is no distension. Palpations: Abdomen is soft. There is no mass. Tenderness: There is no right CVA tenderness, left CVA tenderness or guarding. Hernia: No hernia is present. Skin:     Capillary Refill: Capillary refill takes less than 2 seconds. Neurological:      Mental Status: She is alert and oriented to person, place, and time. Psychiatric:         Mood and Affect: Mood normal.         Behavior: Behavior normal.         Thought Content: Thought content normal.       MDM     Differential Diagnosis; Clinical Impression; Plan:       CLINICAL IMPRESSION:  (R10.32) Left groin pain  (primary encounter diagnosis)  (R82.90) Abnormal urinalysis    Plan:  No clear etiology today  Ddx; urinary calculi, adnexal pain, UTI, hernia, constipation, MSK abdominal wall pain  Advised OTC Motrin or Aleve  Should follow up with PCP or OBGYN within 1 week for further evaluation  UA today had trace THELMA- will send culture to evaluate for any UTI   Urine pregnancy negative      We have reviewed concerning signs/symptoms, normal vs abnormal progression of medical condition and when to seek immediate medical attention. Schedule with PCP or Urgent Care immediately for worsening or new symptoms.             Results for orders placed or performed in visit on 12/09/22   AMB POC URINALYSIS DIP STICK AUTO W/O MICRO   Result Value Ref Range    Color (UA POC) Yellow     Clarity (UA POC) Clear     Glucose (UA POC) Negative Negative Bilirubin (UA POC) Negative Negative    Ketones (UA POC) Negative Negative    Specific gravity (UA POC) 1.020 1.001 - 1.035    Blood (UA POC) Negative Negative    pH (UA POC) 5.5 4.6 - 8.0    Protein (UA POC) Negative Negative    Urobilinogen (UA POC) 0.2 mg/dL 0.2 - 1    Nitrites (UA POC) Negative Negative    Leukocyte esterase (UA POC) Trace Negative   AMB POC URINE PREGNANCY TEST, VISUAL COLOR COMPARISON   Result Value Ref Range    VALID INTERNAL CONTROL POC Yes     HCG urine, Ql. (POC) Negative Negative       Procedures

## 2022-12-09 NOTE — PATIENT INSTRUCTIONS
Please follow up with OBGYN or PCP within 1 week. Any worse, go to ED for further evaluation and management.

## 2022-12-09 NOTE — TELEPHONE ENCOUNTER
Spoke with patient. She said that  told her to follow up with GYN. She is going to call them to schedule appt. Maryanne Diehl

## 2023-03-21 ENCOUNTER — OFFICE VISIT (OUTPATIENT)
Dept: FAMILY MEDICINE CLINIC | Age: 33
End: 2023-03-21
Payer: MEDICAID

## 2023-03-21 VITALS
WEIGHT: 177 LBS | BODY MASS INDEX: 30.22 KG/M2 | HEIGHT: 64 IN | DIASTOLIC BLOOD PRESSURE: 74 MMHG | TEMPERATURE: 98.1 F | HEART RATE: 71 BPM | RESPIRATION RATE: 17 BRPM | OXYGEN SATURATION: 100 % | SYSTOLIC BLOOD PRESSURE: 115 MMHG

## 2023-03-21 DIAGNOSIS — G47.10 EXCESSIVE SLEEPINESS: ICD-10-CM

## 2023-03-21 DIAGNOSIS — R53.83 FATIGUE, UNSPECIFIED TYPE: Primary | ICD-10-CM

## 2023-03-21 DIAGNOSIS — R10.2 PELVIC PAIN IN FEMALE: ICD-10-CM

## 2023-03-21 PROCEDURE — 99214 OFFICE O/P EST MOD 30 MIN: CPT | Performed by: STUDENT IN AN ORGANIZED HEALTH CARE EDUCATION/TRAINING PROGRAM

## 2023-03-21 NOTE — PATIENT INSTRUCTIONS
Amy Huerta and Dr. Mendel Zhao (also does pediatric sleep medicine)    [address] [facility] [address], [place], [address] [facility]  [phone] [address] [place]. [place], [address]   Tel.  [phone]   Fax. [address] [facility] [address]   [place], [address] [place] [address]   Tel.  [phone]   Fax. [phone]  [address] [address]   Tel.  [phone]   Fax. [phone]            [facility] (also does home sleep test)    Dr. Wiliam Peabody DR. PRICE KASEY MCFARLING, [place]  or [facility] (SLEEP MEDICINE)  Dr. Doug Reyna    [phone]

## 2023-03-21 NOTE — PROGRESS NOTES
Name and  Verified. Previous PCP: Dr. Nick Burk (2020)    Pharmacy verified     Chief Complaint   Patient presents with    Physical    Fatigue     X 2 weeks      Patient fasting for labs, [facility].    Patient stated she has been sleeping a lot. Struggles to wake up    1. Have you been to the ER, urgent care clinic since your last visit? Hospitalized since your last visit? No    2. Have you seen or consulted any other health care providers outside of the [address] [place] [address] since your last visit? Include any pap smears or colon screening.    Yes  OBGYN  2023  Left Lower Abdominal Pain      Health Maintenance Due   Topic Date Due    Hepatitis C Screening  Never done    Cervical cancer screen  Never done    Flu Vaccine (1) 2022

## 2023-03-21 NOTE — PROGRESS NOTES
[address]  [address]. [address]  [phone]    C/C: Fatigue and pubic pain    HPI:  Jc Cristina is a 33 y.o. female here for evaluation of the above;    Acute/chronic concerns:   Fatigue: This is ongoing for 2 weeks. States that she sleeps a lot and has bee fatigue. States that she tend to sleep through the night although the  reports that she snores a lot. States that periods are regular and flow is normal.    Left pubic pain:  Ongoing for about 4 months. Has been through her OB/GYN (Dr. Mark Andres) and they did ultrasound (Transvaginal and Transabdominal) which were both normal per patient. Lab work has been unremarkable thus far. No blood in the stool. Bowel fluctuates between constipation and loose. States that the pain is sharp and sometimes worst with bowel movements. But does not feel like she is constipated everyday.      General Health Habits:  Physical activity: not much per patient  Smoking Hx: no   Alcohol Use: no  Occupation: stay home mom   and has 3 children    OB/GYN Hx: Dr. Cody Freire Maintenance:    Health Maintenance Due   Topic Date Due    Hepatitis C Screening  Never done    Varicella Vaccine (1 of 2 - 2-dose childhood series) Never done    Cervical cancer screen  Never done    Flu Vaccine (1) 08/01/2022       Preventive Screenings:    3 most recent PHQ Screens 3/21/2023   Little interest or pleasure in doing things Not at all   Feeling down, depressed, irritable, or hopeless Not at all   Total Score PHQ 2 0   Trouble falling or staying asleep, or sleeping too much Not at all   Feeling tired or having little energy Not at all   Poor appetite, weight loss, or overeating Not at all   Feeling bad about yourself - or that you are a failure or have let yourself or your family down Not at all   Trouble concentrating on things such as school, work, reading, or watching TV Not at all   Moving or speaking so slowly that other people could have noticed; or the opposite being so fidgety that others notice Not at all   Thoughts of being better off dead, or hurting yourself in some way Not at all   PHQ 9 Score 0   How difficult have these problems made it for you to do your work, take care of your home and get along with others Not difficult at all         Current Outpatient Medications   Medication Sig Dispense Refill    levonorgestreL (MIRENA) 20 mcg/24 hours (5 yrs) 52 mg IUD 1 Device by IntraUTERine route once. topiramate (TOPAMAX) 25 mg tablet Take 1 Tab by mouth two (2) times a day. 60 Tab 1       Allergies:   Patient has no known allergies. Social History     Socioeconomic History    Marital status:      Spouse name: Not on file    Number of children: Not on file    Years of education: Not on file    Highest education level: Not on file   Occupational History    Not on file   Tobacco Use    Smoking status: Never     Passive exposure: Never    Smokeless tobacco: Never   Vaping Use    Vaping Use: Never used   Substance and Sexual Activity    Alcohol use: No     Alcohol/week: 0.0 standard drinks    Drug use: No    Sexual activity: Yes     Partners: Male   Other Topics Concern    Not on file   Social History Narrative    Not on file     Social Determinants of Health     Financial Resource Strain: Not on file   Food Insecurity: Not on file   Transportation Needs: Not on file   Physical Activity: Not on file   Stress: Not on file   Social Connections: Not on file   Intimate Partner Violence: Not on file   Housing Stability: Not on file       Family History   Problem Relation Age of Onset    Hypertension Mother        Patient Active Problem List   Diagnosis Code    Finger pain, left M79.645       ROS:      A comprehensive review of systems was negative except for that written in the History of Present Illness.        Physical Exam:  Visit Vitals  /74 (BP 1 Location: Right arm, BP Patient Position: Sitting, BP Cuff Size: Adult)   Pulse 71   Temp 98.1 °F (36.7 °C) (Oral)   Resp 17   Ht 5' 4\" (1.626 m)   Wt 177 lb (80.3 kg)   LMP 02/22/2023   SpO2 100%   BMI 30.38 kg/m²     General appearance: alert, cooperative, no distress, appears stated age  Head: Normocephalic, without obvious abnormality, atraumatic, sinuses nontender to percussion  Eyes: conjunctivae/corneas clear. PERRL, EOM's intact. Ears: normal TM's and external ear canals AU  Nose: Nares normal. Septum midline. Mucosa normal. No drainage or sinus tenderness. Throat: Lips, mucosa, and tongue normal. Teeth and gums normal  Neck: supple, symmetrical, trachea midline, no adenopathy, thyroid: not enlarged, symmetric, no tenderness/mass/nodules, no carotid bruit and no JVD  Back: symmetric, no curvature. ROM normal. No CVA tenderness. Lungs: clear to auscultation bilaterally, no wheezes, no increased work of breathing  Heart: regular rate and rhythm, S1, S2 normal, no murmur, click, rub or gallop  Abdomen: soft, non-tender. Bowel sounds normal. No masses,  no organomegaly  Extremities: extremities normal, atraumatic, no cyanosis or edema. Skin: Skin color, texture, turgor normal. No rashes or lesions  Lymph nodes: Cervical, supraclavicular, and axillary nodes normal.  Neurologic: Alert and oriented X 3, normal strength and tone. Normal symmetric reflexes. Normal coordination and gait    Pelvic exam:  Exam chaperoned by Shiva Russell MA. Localized tenderness on left suprapubic area. No abscess. Assessment and Plan:    ICD-10-CM ICD-9-CM    1. Fatigue, unspecified type  R53.83 780.79 TSH 3RD GENERATION      METABOLIC PANEL, COMPREHENSIVE      CBC W/O DIFF      VITAMIN D, 25 HYDROXY      TSH 3RD GENERATION      METABOLIC PANEL, COMPREHENSIVE      CBC W/O DIFF      VITAMIN D, 25 HYDROXY      2. Excessive sleepiness  G47.10 780.54 SLEEP MEDICINE REFERRAL      3. Pelvic pain in female  R10.2 625.9 CT PELV W WO CONT        1.  Fatigue, unspecified type    - TSH 3RD GENERATION; Future  - METABOLIC PANEL, COMPREHENSIVE; Future  - CBC W/O DIFF; Future  - VITAMIN D, 25 HYDROXY; Future    2. Excessive sleepiness  - SLEEP MEDICINE REFERRAL    3. Pelvic pain in female  Work up negative thus far. Localized tenderness on suprapubic region  - CT PELV W WO CONT; Future      Follow up: 2-3 months or sooner if needed. RTC to clinic sooner if needed    We discussed the expected course, resolution and complications of the diagnosis(es) in detail. Medication risks, benefits, costs, interactions, and alternatives were discussed as indicated. I advised to contact the office if his condition worsens, changes or fails to improve as anticipated. Pt expressed understanding with the diagnosis(es) and plan. Patient understands that this encounter was a temporary measure, and the importance of further follow up and examination was emphasized. Patient verbalized understanding.       Signed By: Gunjan De La Cruz MD     March 21, 2023

## 2023-03-22 ENCOUNTER — PATIENT MESSAGE (OUTPATIENT)
Dept: SLEEP MEDICINE | Age: 33
End: 2023-03-22

## 2023-03-22 LAB
25(OH)D3+25(OH)D2 SERPL-MCNC: 19.9 NG/ML (ref 30–100)
ALBUMIN SERPL-MCNC: 4.8 G/DL (ref 3.8–4.8)
ALBUMIN/GLOB SERPL: 1.8 {RATIO} (ref 1.2–2.2)
ALP SERPL-CCNC: 66 IU/L (ref 44–121)
ALT SERPL-CCNC: 12 IU/L (ref 0–32)
AST SERPL-CCNC: 10 IU/L (ref 0–40)
BILIRUB SERPL-MCNC: 0.4 MG/DL (ref 0–1.2)
BUN SERPL-MCNC: 10 MG/DL (ref 6–20)
BUN/CREAT SERPL: 16 (ref 9–23)
CALCIUM SERPL-MCNC: 9.2 MG/DL (ref 8.7–10.2)
CHLORIDE SERPL-SCNC: 102 MMOL/L (ref 96–106)
CO2 SERPL-SCNC: 20 MMOL/L (ref 20–29)
CREAT SERPL-MCNC: 0.63 MG/DL (ref 0.57–1)
EGFRCR SERPLBLD CKD-EPI 2021: 122 ML/MIN/1.73
ERYTHROCYTE [DISTWIDTH] IN BLOOD BY AUTOMATED COUNT: 13 % (ref 11.7–15.4)
GLOBULIN SER CALC-MCNC: 2.7 G/DL (ref 1.5–4.5)
GLUCOSE SERPL-MCNC: 83 MG/DL (ref 70–99)
HCT VFR BLD AUTO: 39.4 % (ref 34–46.6)
HGB BLD-MCNC: 13 G/DL (ref 11.1–15.9)
MCH RBC QN AUTO: 27.7 PG (ref 26.6–33)
MCHC RBC AUTO-ENTMCNC: 33 G/DL (ref 31.5–35.7)
MCV RBC AUTO: 84 FL (ref 79–97)
PLATELET # BLD AUTO: 206 X10E3/UL (ref 150–450)
POTASSIUM SERPL-SCNC: 4 MMOL/L (ref 3.5–5.2)
PROT SERPL-MCNC: 7.5 G/DL (ref 6–8.5)
RBC # BLD AUTO: 4.7 X10E6/UL (ref 3.77–5.28)
SODIUM SERPL-SCNC: 136 MMOL/L (ref 134–144)
TSH SERPL DL<=0.005 MIU/L-ACNC: 2.32 UIU/ML (ref 0.45–4.5)
WBC # BLD AUTO: 7 X10E3/UL (ref 3.4–10.8)

## 2023-04-03 ENCOUNTER — HOSPITAL ENCOUNTER (OUTPATIENT)
Dept: CT IMAGING | Age: 33
End: 2023-04-03
Attending: STUDENT IN AN ORGANIZED HEALTH CARE EDUCATION/TRAINING PROGRAM
Payer: MEDICAID

## 2023-04-03 DIAGNOSIS — R10.2 PELVIC PAIN IN FEMALE: ICD-10-CM

## 2023-04-03 PROCEDURE — 72193 CT PELVIS W/DYE: CPT

## 2023-04-03 PROCEDURE — 74011000636 HC RX REV CODE- 636: Performed by: STUDENT IN AN ORGANIZED HEALTH CARE EDUCATION/TRAINING PROGRAM

## 2023-04-03 RX ADMIN — IOMEPROL INJECTION 100 ML: 714 INJECTION, SOLUTION INTRAVASCULAR at 07:19

## 2023-04-05 NOTE — PROGRESS NOTES
Reviewed. Discussed results with the patient.   States that she was already told by her ob/gyn that she has a small cyst on the left pelvis

## 2023-04-17 ENCOUNTER — OFFICE VISIT (OUTPATIENT)
Dept: SLEEP MEDICINE | Age: 33
End: 2023-04-17
Payer: MEDICAID

## 2023-04-17 VITALS
HEART RATE: 90 BPM | WEIGHT: 182.8 LBS | TEMPERATURE: 97.6 F | BODY MASS INDEX: 31.21 KG/M2 | DIASTOLIC BLOOD PRESSURE: 69 MMHG | OXYGEN SATURATION: 94 % | SYSTOLIC BLOOD PRESSURE: 98 MMHG | HEIGHT: 64 IN

## 2023-04-17 DIAGNOSIS — E66.9 OBESITY, CLASS I, BMI 30-34.9: ICD-10-CM

## 2023-04-17 DIAGNOSIS — G47.33 OBSTRUCTIVE SLEEP APNEA (ADULT) (PEDIATRIC): Primary | ICD-10-CM

## 2023-04-17 PROCEDURE — 99204 OFFICE O/P NEW MOD 45 MIN: CPT | Performed by: INTERNAL MEDICINE

## 2023-04-17 NOTE — PROGRESS NOTES
[address] [facility] [place]. [place], [address]  Tel.  [phone]  Fax. [address] [facility] [address]  [place], [address] [facility]  Tel.  [phone]  Fax. [phone] [address]  Tel.  [phone]  Fax. [phone]         Subjective:      Sebastien Taylor is an 33 y.o. female referred for evaluation for a sleep disorder. She complains of excessive daytime sleepiness associated with snoring. Symptoms began a few months ago, gradually improving since that time. She usually can fall asleep in a few minutes. Family or house members note snoring. She denies falling asleep while driving. Sebastien Taylor does not wake up frequently at night. She is not bothered by waking up too early and left unable to get back to sleep. She actually sleeps about 7 hours at night and wakes up about 1 times during the night. She does not work shifts: Isidra Dominguez indicates she does not get too little sleep at night. Her bedtime is 2200. She awakens at 56. She does not take naps. . She has the following observed behaviors: Light snoring, Grinding teeth, Biting tongue;  . Other remarks:    She has 3 young children. They occasionally wake her up at night but not more than once a week. It does take her awhile to fall back to sleep if she is awakened. [place] Sleepiness Score: 4      No Known Allergies    No current outpatient medications on file. She  has no past medical history on file. She  has a past surgical history that includes hx tubal ligation. She family history includes Hypertension in her mother. She  reports that she has never smoked. She has never been exposed to tobacco smoke. She has never used smokeless tobacco. She reports that she does not drink alcohol and does not use drugs. Review of Systems:  Constitutional:  No significant weight loss or weight gain. Eyes:  No blurred vision.   CVS:  No significant chest pain  Pulm:  No significant shortness of breath  GI:  No significant nausea or vomiting  :  No significant nocturia  Musculoskeletal:  No significant joint pain at night  Skin:  No significant rashes  Neuro:  No significant dizziness   Psych:  No active mood issues    Sleep Review of Systems: notable for no difficulty falling asleep; infrequent awakenings at night;  some dreaming noted; no nightmares ;+ early morning headaches; no memory problems; no concentration issues    Objective:   Visit Vitals  BP 98/69 (BP 1 Location: Left upper arm, BP Patient Position: Sitting, BP Cuff Size: Adult long)   Pulse 90   Temp 97.6 °F (36.4 °C) (Temporal)   Ht 5' 4\" (1.626 m)   Wt 182 lb 12.8 oz (82.9 kg)   LMP 02/22/2023   SpO2 94%   BMI 31.38 kg/m²         General:   Not in acute distress   Eyes:  Anicteric sclerae, no obvious strabismus   Nose:  No obvious nasal septum deviation    Oropharynx:   Class 3 oropharyngeal outlet, thick tongue base, enlarged and boggy uvula, low-lying soft palate, narrow tonsilo-pharyngeal pilars   Tonsils:   tonsils are present and normal   Neck:    ;16 inches; midline trachea   Chest/Lungs:  Equal lung expansion, clear on auscultation    CVS:  Normal rate, regular rhythm; no JVD   Skin:  Warm to touch; no obvious rashes   Neuro:  No focal deficits ; no obvious tremor    Psych:  Normal affect,  normal countenance;          Assessment:       ICD-10-CM ICD-9-CM    1. Obstructive sleep apnea (adult) (pediatric)  G47.33 327.23 SLEEP STUDY UNATTENDED, 4 CHANNEL      2. Obesity, Class I, BMI 30-34.9  E66.9 278.00             Plan:     * The patient currently has a moderate Risk for having sleep apnea. STOP-BANG score 3.  * HSAT was ordered for initial evaluation. Treatment options for sleep apnea were reviewed. she would like to proceed with a trial of PAP if found to have significant apnea. * She was provided information on sleep apnea including coresponding risk factors and the importance of proper treatment.   * Counseling was provided regarding proper sleep hygiene and safe driving. 2. Obesity - weight has been stable. I have discussed the relationship of weight to obstructive sleep apnea. I have advised her to start a weight loss plan. she understands that weight loss can reduce severity of sleep apnea and snoring. The treatment plan was reviewed with the patient in detail . she understands that the lead technologist will be calling her  with the results or notifying of results via [address] [place] [address] and assisting with the next step in the treatment plan as outlined today during the consultation with me. All of her questions were addressed. Thank you for allowing us to participate in your patient's medical care. We'll keep you updated on these investigations.   Electronically signed by    Colton Corrales MD  Diplomate in Sleep Medicine  [facility]  4/17/2023

## 2023-04-17 NOTE — PATIENT INSTRUCTIONS
[address] [facility] [place]. [place], [address]  Tel.  [phone]  Fax. 100 [facility] 60  [place], [address] [facility]  Tel.  [phone]  Fax. [phone] [address]  Tel.  [phone]  Fax. [phone]     Sleep Apnea: After Your Visit  Your Care Instructions  Sleep apnea occurs when you frequently stop breathing for 10 seconds or longer during sleep. It can be mild to severe, based on the number of times per hour that you stop breathing or have slowed breathing. Blocked or narrowed airways in your nose, mouth, or throat can cause sleep apnea. Your airway can become blocked when your throat muscles and tongue relax during sleep. Sleep apnea is common, occurring in 1 out of 20 individuals. Individuals having any of the following characteristics should be evaluated and treated right away due to high risk and detrimental consequences from untreated sleep apnea:  Obesity  Congestive Heart failure  Atrial Fibrillation  Uncontrolled Hypertension  Type II Diabetes  Night-time Arrhythmias  Stroke  Pulmonary Hypertension  High-risk Driving Populations (pilots, truck drivers, etc.)  Patients Considering Weight-loss Surgery    How do you know you have sleep apnea? You probably have sleep apnea if you answer 'yes' to 3 or more of the following questions:  S - Have you been told that you Snore? T - Are you often Tired during the day? O - Has anyone Observed you stop breathing while sleeping? P- Do you have (or are being treated for) high blood Pressure? B - Are you obese (Body Mass Index > 35)? A - Is your Age 48years old or older? N - Is your Neck size greater than 16 inches? G - Are you male Gender? A sleep physician can prescribe a breathing device that prevents tissues in the throat from blocking your airway. Or your doctor may recommend using a dental device (oral breathing device) to help keep your airway open.  In some cases, surgery may be needed to remove enlarged tissues in the throat. Follow-up care is a key part of your treatment and safety. Be sure to make and go to all appointments, and call your doctor if you are having problems. It's also a good idea to know your test results and keep a list of the medicines you take. How can you care for yourself at home? Lose weight, if needed. It may reduce the number of times you stop breathing or have slowed breathing. Go to bed at the same time every night. Sleep on your side. It may stop mild apnea. If you tend to roll onto your back, sew a pocket in the back of your [employer] top. Put a tennis ball into the pocket, and stitch the pocket shut. This will help keep you from sleeping on your back. Avoid alcohol and medicines such as sleeping pills and sedatives before bed. Do not smoke. Smoking can make sleep apnea worse. If you need help quitting, talk to your doctor about stop-smoking programs and medicines. These can increase your chances of quitting for good. Prop up the head of your bed 4 to 6 inches by putting bricks under the legs of the bed. Treat breathing problems, such as a stuffy nose, caused by a cold or allergies. Use a continuous positive airway pressure (CPAP) breathing machine if lifestyle changes do not help your apnea and your doctor recommends it. The machine keeps your airway from closing when you sleep. If CPAP does not help you, ask your doctor whether you should try other breathing machines. A bilevel positive airway pressure machine has two types of air pressureâone for breathing in and one for breathing out. Another device raises or lowers air pressure as needed while you breathe. If your nose feels dry or bleeds when using one of these machines, talk with your doctor about increasing moisture in the air. A humidifier may help.   If your nose is runny or stuffy from using a breathing machine, talk with your doctor about using decongestants or a corticosteroid nasal spray.  When should you call for help? Watch closely for changes in your health, and be sure to contact your doctor if:  You still have sleep apnea even though you have made lifestyle changes. You are thinking of trying a device such as CPAP. You are having problems using a CPAP or similar machine. Where can you learn more? Go to YoBuck[employer]. Enter N908 in the search box to learn more about \"Sleep Apnea: After Your Visit. \"   © 4204-8509 Healthwise, Incorporated. Care instructions adapted under license by [facility] (which disclaims liability or warranty for this information). This care instruction is for use with your licensed healthcare professional. If you have questions about a medical condition or this instruction, always ask your healthcare professional. Debbie Hoist any warranty or liability for your use of this information. PROPER SLEEP HYGIENE    What to avoid  Do not have drinks with caffeine, such as coffee or black tea, for 8 hours before bed. Do not smoke or use other types of tobacco near bedtime. Nicotine is a stimulant and can keep you awake. Avoid drinking alcohol late in the evening, because it can cause you to wake in the middle of the night. Do not eat a big meal close to bedtime. If you are hungry, eat a light snack. Do not drink a lot of water close to bedtime, because the need to urinate may wake you up during the night. Do not read or watch TV in bed. Use the bed only for sleeping and sexual activity. What to try  Go to bed at the same time every night, and wake up at the same time every morning. Do not take naps during the day. Keep your bedroom quiet, dark, and cool. Get regular exercise, but not within 3 to 4 hours of your bedtime. .  Sleep on a comfortable pillow and mattress. If watching the clock makes you anxious, turn it facing away from you so you cannot see the time.   If you worry when you lie down, start a worry book. Well before bedtime, write down your worries, and then set the book and your concerns aside. Try meditation or other relaxation techniques before you go to bed. If you cannot fall asleep, get up and go to another room until you feel sleepy. Do something relaxing. Repeat your bedtime routine before you go to bed again. Make your house quiet and calm about an hour before bedtime. Turn down the lights, turn off the TV, log off the computer, and turn down the volume on music. This can help you relax after a busy day. Drowsy Driving  The [address] [place] [address] Road Traffic Safety Administration cites drowsiness as a causing factor in more than 798,239 police reported crashes annually, resulting in 76,000 injuries and 1,500 deaths. Other surveys suggest 55% of people polled have driven while drowsy in the past year, 23% had fallen asleep but not crashed, 3% crashed, and 2% had and accident due to drowsy driving. Who is at risk? Young Drivers: One study of drowsy driving accidents states that 55% of the drivers were under 25 years. Of those, 75% were male. Shift Workers and Travelers: People who work overnight or travel across time zones frequently are at higher risk of experiencing Circadian Rhythm Disorders. They are trying to work and function when their body is programed to sleep. Sleep Deprived: Lack of sleep has a serious impact on your ability to pay attention or focus on a task. Consistently getting less than the average of 8 hours your body needs creates partial or cumulative sleep deprivation. Untreated Sleep Disorders: Sleep Apnea, Narcolepsy, R.L.S., and other sleep disorders (untreated) prevent a person from getting enough restful sleep. This leads to excessive daytime sleepiness and increases the risk for drowsy driving accidents by up to 7 times. Medications / Alcohol: Even over the counter medications can cause drowsiness.  Medications that impair a drivers attention should have a warning label. Alcohol naturally makes you sleepy and on its own can cause accidents. Combined with excessive drowsiness its effects are amplified. Signs of Drowsy Driving:   * You don't remember driving the last few miles   * You may drift out of your agnes   * You are unable to focus and your thoughts wander   * You may yawn more often than normal   * You have difficulty keeping your eyes open / nodding off   * Missing traffic signs, speeding, or tailgating  Prevention-   Good sleep hygiene, lifestyle and behavioral choices have the most impact on drowsy driving. There is no substitute for sleep and the average person requires 8 hours nightly. If you find yourself driving drowsy, stop and sleep. Consider the sleep hygiene tips provided during your visit as well. Medication Refill Policy: Refills for all medications require 1 week advance notice. Please have your pharmacy fax a refill request. We are unable to fax, or call in \"controled substance\" medications and you will need to pick these prescriptions up from our office. [employer] Activation    Thank you for requesting access to [employer]. Please follow the instructions below to securely access and download your online medical record. [employer] allows you to send messages to your doctor, view your test results, renew your prescriptions, schedule appointments, and more. How Do I Sign Up? In your internet browser, go to [email or web address]. Click on the First Time User? Click Here link in the Sign In box. You will see the New Member Sign Up page. Enter your [employer] Access Code exactly as it appears below. You will not need to use this code after youve completed the sign-up process. If you do not sign up before the expiration date, you must request a new code. [employer] Access Code:  Activation code not generated  Current [employer] Status: Active (This is the date your [employer] access code will )    Enter the last four digits of your Social Security Number (xxxx) and Date of Birth (mm/dd/yyyy) as indicated and click Submit. You will be taken to the next sign-up page. Create a [employer] ID. This will be your [employer] login ID and cannot be changed, so think of one that is secure and easy to remember. Create a [employer] password. You can change your password at any time. Enter your Password Reset Question and Answer. This can be used at a later time if you forget your password. Enter your e-mail address. You will receive e-mail notification when new information is available in [address] Click Sign Up. You can now view and download portions of your medical record. Click the [employer] link to download a portable copy of your medical information. Additional Information    If you have questions, please call [phone]. Remember, [employer] is NOT to be used for urgent needs. For medical emergencies, dial 911.

## 2023-04-29 ENCOUNTER — PREP FOR PROCEDURE (OUTPATIENT)
Age: 33
End: 2023-04-29

## 2023-04-29 DIAGNOSIS — G47.33 OBSTRUCTIVE SLEEP APNEA (ADULT) (PEDIATRIC): Primary | ICD-10-CM

## 2023-05-23 SDOH — ECONOMIC STABILITY: FOOD INSECURITY: WITHIN THE PAST 12 MONTHS, THE FOOD YOU BOUGHT JUST DIDN'T LAST AND YOU DIDN'T HAVE MONEY TO GET MORE.: NEVER TRUE

## 2023-05-23 SDOH — ECONOMIC STABILITY: INCOME INSECURITY: HOW HARD IS IT FOR YOU TO PAY FOR THE VERY BASICS LIKE FOOD, HOUSING, MEDICAL CARE, AND HEATING?: NOT VERY HARD

## 2023-05-23 SDOH — ECONOMIC STABILITY: HOUSING INSECURITY
IN THE LAST 12 MONTHS, WAS THERE A TIME WHEN YOU DID NOT HAVE A STEADY PLACE TO SLEEP OR SLEPT IN A SHELTER (INCLUDING NOW)?: NO

## 2023-05-23 SDOH — ECONOMIC STABILITY: FOOD INSECURITY: WITHIN THE PAST 12 MONTHS, YOU WORRIED THAT YOUR FOOD WOULD RUN OUT BEFORE YOU GOT MONEY TO BUY MORE.: NEVER TRUE

## 2023-05-23 SDOH — ECONOMIC STABILITY: TRANSPORTATION INSECURITY
IN THE PAST 12 MONTHS, HAS LACK OF TRANSPORTATION KEPT YOU FROM MEETINGS, WORK, OR FROM GETTING THINGS NEEDED FOR DAILY LIVING?: NO

## 2023-05-25 ENCOUNTER — OFFICE VISIT (OUTPATIENT)
Age: 33
End: 2023-05-25
Payer: MEDICAID

## 2023-05-25 VITALS
BODY MASS INDEX: 30.73 KG/M2 | SYSTOLIC BLOOD PRESSURE: 113 MMHG | TEMPERATURE: 98.2 F | DIASTOLIC BLOOD PRESSURE: 78 MMHG | HEART RATE: 70 BPM | OXYGEN SATURATION: 99 % | HEIGHT: 64 IN | WEIGHT: 180 LBS | RESPIRATION RATE: 17 BRPM

## 2023-05-25 DIAGNOSIS — L72.0 EPIDERMAL CYST: ICD-10-CM

## 2023-05-25 DIAGNOSIS — R53.82 CHRONIC FATIGUE: Primary | ICD-10-CM

## 2023-05-25 PROCEDURE — 99213 OFFICE O/P EST LOW 20 MIN: CPT | Performed by: STUDENT IN AN ORGANIZED HEALTH CARE EDUCATION/TRAINING PROGRAM

## 2023-05-25 NOTE — PROGRESS NOTES
[address]  [address]es[address]  [phone]    Chief Complaint: Fatigue and cyst on thigh    Subjective  Tyron Lozano is a 33 y.o. Hispanic / [email or web address] female , established patient, here for evaluation of the concern(s) above; Fatigue:  Improved although still on and off. Sleep have improved. States that sleeping better at night makes her better. States that she started going to bed as soon as the kids go to bed. Saw sleep medicine and scheduled to have a sleep study. Will do it at home. Cyst on left thigh:  Has had a small lump on the medial aspect of left thigh for several years. Have not changed in size. Does not appear to bother her. No redness or pain    Pertinent negatives include no chest pain, no abdominal pain and no shortness of breath. Allergies - reviewed:   No Known Allergies      Review of systems:   A comprehensive review of systems was negative except for that written in the History of Present Illness. Physical Exam  /78 (Site: Left Upper Arm, Position: Sitting, Cuff Size: Medium Adult)   Pulse 70   Temp 98.2 °F (36.8 °C) (Oral)   Resp 17   Ht 5' 4\" (1.626 m)   Wt 180 lb (81.6 kg)   LMP 05/18/2023   SpO2 99%   BMI 30.90 kg/m²     General: Alert and oriented, in no acute distress. Well nourished. LUNGS: Respirations unlabored; clear to auscultation bilaterally. CARDIOVASCULAR: Regular, rate, and rhythm without murmurs, gallops or rubs. SKIN: Small skin color rubbery mass on the medial left thigh. Chaperon by Jorge [place] LILIA Lanier. Assessment/Plan   Diagnosis Orders   1. Chronic fatigue        2. Epidermal cyst          1. Chronic fatigue  Improving with better sleep hygiene  -will be getting sleep study    2. Epidermal cyst  Will continue to monitor for now. Will send to derm if patient wants it removed. Follow up: as needed.  RTC to clinic sooner should symptoms persist, worsen

## 2023-05-25 NOTE — PROGRESS NOTES
Name and Date of Birth Verified    Pharmacy Verified    Chief Complaint   Patient presents with    Follow-up     3/21/2023 Other fatigue        1. \"Have you been to the ER, urgent care clinic since your last visit? Hospitalized since your last visit? \" No    2. \"Have you seen or consulted any other health care providers outside of the [address] [place] [address] since your last visit? \" No     3. For patients aged 39-70: Has the patient had a colonoscopy / FIT/ Cologuard? N/A      If the patient is female:    4. For patients aged 41-77: Has the patient had a mammogram within the past 2 years? No      5. For patients aged 21-65: Has the patient had a pap smear?  Yes 2022     Health Maintenance Due   Topic Date Due    COVID-19 Vaccine (1) Never done    Varicella vaccine (1 of 2 - 2-dose childhood series) Never done    Hepatitis C screen  Never done    Cervical cancer screen  Never done

## 2023-09-13 ENCOUNTER — TELEPHONE (OUTPATIENT)
Age: 33
End: 2023-09-13

## 2023-09-13 NOTE — TELEPHONE ENCOUNTER
Cancelled home sleep test  for Friday 9/15. Patient presented with MIKEL pimentel at appointment in April. Since then, its changed to [facility]. We are in need of the new [employer] ID number. Once we receive this, we can send a request to [employer] to facilitate the Home Sleep Testing. Left this detailed message on patients machine.

## 2023-09-15 ENCOUNTER — CLINICAL DOCUMENTATION (OUTPATIENT)
Age: 33
End: 2023-09-15

## 2023-10-18 ENCOUNTER — TELEPHONE (OUTPATIENT)
Age: 33
End: 2023-10-18

## 2023-10-27 NOTE — TELEPHONE ENCOUNTER
Results of HSAT (Thomas) report reviewed  Tech to review results with patient    The home sleep apnea test showed AHI - 2. 2.hour. THe lowest oxygen saturation was 91%. This correlates with a test that is negative for significant sleep apnea. We had discussed treatment plan at initial consultation. Based on the results of the home sleep apnea test, APAP is not indicated at this time. Optimizing sleep habits by keeping bedtime and waketime regular; ensuring sufficient total sleep time; avoiding caffeine after 2 pm; avoid looking at the clock during the night. Ideally, the clock face should be turned away. A regular exercise schedule, at least 3 hours before bedtime, would be beneficial to improving sleep quality. avoid evening light and to use sunglasses in the late afternoon. Watching TV, using laptops, tablets and smartphones in the evening was discouraged. keep the bedroom cool and dark. Repeat HSAT is indicated if symptoms worsen.

## 2024-04-02 ENCOUNTER — TELEPHONE (OUTPATIENT)
Age: 34
End: 2024-04-02

## 2024-04-02 NOTE — TELEPHONE ENCOUNTER
Patient would like to see  soon regarding leg pain and numbness down her thigh she can be reached @ [phone]

## 2024-04-05 ENCOUNTER — ANCILLARY PROCEDURE (OUTPATIENT)
Age: 34
End: 2024-04-05
Payer: MEDICAID

## 2024-04-05 ENCOUNTER — OFFICE VISIT (OUTPATIENT)
Age: 34
End: 2024-04-05
Payer: MEDICAID

## 2024-04-05 VITALS
SYSTOLIC BLOOD PRESSURE: 119 MMHG | TEMPERATURE: 97.6 F | WEIGHT: 188.93 LBS | BODY MASS INDEX: 32.26 KG/M2 | DIASTOLIC BLOOD PRESSURE: 80 MMHG | HEIGHT: 64 IN | RESPIRATION RATE: 17 BRPM | OXYGEN SATURATION: 99 % | HEART RATE: 70 BPM

## 2024-04-05 DIAGNOSIS — M54.16 LUMBAR RADICULOPATHY, CHRONIC: ICD-10-CM

## 2024-04-05 DIAGNOSIS — R10.2 PELVIC PAIN: ICD-10-CM

## 2024-04-05 DIAGNOSIS — M54.16 LUMBAR RADICULOPATHY, CHRONIC: Primary | ICD-10-CM

## 2024-04-05 LAB
CRP SERPL-MCNC: <0.29 MG/DL (ref 0–0.3)
ERYTHROCYTE [SEDIMENTATION RATE] IN BLOOD: 6 MM/HR (ref 0–20)

## 2024-04-05 PROCEDURE — 99214 OFFICE O/P EST MOD 30 MIN: CPT | Performed by: STUDENT IN AN ORGANIZED HEALTH CARE EDUCATION/TRAINING PROGRAM

## 2024-04-05 PROCEDURE — 72110 X-RAY EXAM L-2 SPINE 4/>VWS: CPT

## 2024-04-05 PROCEDURE — 72190 X-RAY EXAM OF PELVIS: CPT

## 2024-04-05 RX ORDER — CYCLOBENZAPRINE HCL 10 MG
10 TABLET ORAL
Qty: 60 TABLET | Refills: 0 | Status: SHIPPED | OUTPATIENT
Start: 2024-04-05 | End: 2024-06-04

## 2024-04-05 RX ORDER — DICLOFENAC SODIUM 75 MG/1
75 TABLET, DELAYED RELEASE ORAL 2 TIMES DAILY PRN
Qty: 60 TABLET | Refills: 0 | Status: SHIPPED | OUTPATIENT
Start: 2024-04-05

## 2024-04-05 ASSESSMENT — PATIENT HEALTH QUESTIONNAIRE - PHQ9
SUM OF ALL RESPONSES TO PHQ QUESTIONS 1-9: 0
SUM OF ALL RESPONSES TO PHQ QUESTIONS 1-9: 0
2. FEELING DOWN, DEPRESSED OR HOPELESS: NOT AT ALL
SUM OF ALL RESPONSES TO PHQ9 QUESTIONS 1 & 2: 0
1. LITTLE INTEREST OR PLEASURE IN DOING THINGS: NOT AT ALL
SUM OF ALL RESPONSES TO PHQ QUESTIONS 1-9: 0
SUM OF ALL RESPONSES TO PHQ QUESTIONS 1-9: 0

## 2024-04-05 NOTE — PROGRESS NOTES
Chief Complaint   Patient presents with    Leg Pain     Numbness Right x 6 months     Denies any injury to right leg. Has low back pain. Pain scale 7 out of 10.    \"Have you been to the ER, urgent care clinic since your last visit?  Hospitalized since your last visit?\"    NO    “Have you seen or consulted any other health care providers outside of [facility] since your last visit?”    NO        “Have you had a pap smear?”      Yes   No cervical cancer screening on file     Vitals:    24 0818   BP: 119/80   Pulse: 70   Resp: 17   Temp: 97.6 °F (36.4 °C)   SpO2: 99%     Health Maintenance Due   Topic Date Due    Hepatitis B vaccine (1 of 3 - 3-dose series) Never done    Varicella vaccine (1 of 2 - 2-dose childhood series) Never done    Hepatitis C screen  Never done    Cervical cancer screen  Never done      The patient, Shireen Hanna, identity was verified by name and , pharmacy verified  Labs:Yes  Fasting:Yes

## 2024-04-05 NOTE — PROGRESS NOTES
[facility]  [address] [place] [address] [phone]    Chief Complaint: Chronic back pain    Subjective  Shireen Hanna is a 33 y.o.  /  female , established patient, here for evaluation of the concern(s) below;    Chronic lower back and pelvic pain:  This is ongoing problem since the age of about 17 per pt.  Pt sometimes has pain that runs down legs.  She had extensive workup through [facility] and myself in the past.    Has been through her OB/GYN (Dr. Rutherford) and they did ultrasound (Transvaginal and Transabdominal) which were both normal per patient.   Lab work has been unremarkable thus far.  No blood in the stool.   Normal BM.    Ordered CT pelvis (4/2023) - which was as below;  1.  No acute abnormality.  2.  1.5 cm Left adnexal mildly complex fluid density focus, may reflect a  complex cyst (e.g., hemorrhagic). Dedicated nonemergent pelvic ultrasound can be  obtained for further characterization if clinically indicated.  3.  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  Correlate clinically.     States that grandfather and aunt had similar problems but not so sure about the specifics.      Pertinent negatives include  no chest pain, no abdominal pain and no shortness of breath.     General Health Habits:  Physical activity: not much per patient  Smoking Hx: no   Alcohol Use: no  Occupation: stay home mom   and has 3 children       Allergies - reviewed:   No Known Allergies    Past Medical History - reviewed:  No past medical history on file.    Depression screening:  PHQ-9 Total Score: 0 (4/5/2024  8:20 AM)      Review of systems:   A comprehensive review of systems was negative except for that written in the History of Present Illness.     Physical Exam  /80   Pulse 70   Temp 97.6 °F (36.4 °C) (Tympanic)   Resp 17   Ht 1.626 m (5' 4\")   Wt 85.7 kg (188 lb 15 oz)   LMP

## 2024-04-12 LAB — HLA-B27 QL NAA+PROBE: NEGATIVE

## 2024-05-07 ENCOUNTER — HOSPITAL ENCOUNTER (OUTPATIENT)
Facility: HOSPITAL | Age: 34
Setting detail: RECURRING SERIES
Discharge: HOME | End: 2024-05-10
Attending: STUDENT IN AN ORGANIZED HEALTH CARE EDUCATION/TRAINING PROGRAM
Payer: MEDICAID

## 2024-05-07 PROCEDURE — 97162 PT EVAL MOD COMPLEX 30 MIN: CPT

## 2024-05-07 NOTE — THERAPY EVALUATION
certify that the above Therapy Services are being furnished while the patient is under my care. I agree with the treatment plan and certify that this therapy is necessary.    Physician's Signature:_________________________   DATE:_________   TIME:________                           Sahil Griffiths MD    ** Signature, Date and Time must be completed for valid certification **  Please sign and fax to [phone].  Thank you

## 2024-05-09 ENCOUNTER — HOSPITAL ENCOUNTER (OUTPATIENT)
Facility: HOSPITAL | Age: 34
Setting detail: RECURRING SERIES
Discharge: HOME | End: 2024-05-12
Attending: STUDENT IN AN ORGANIZED HEALTH CARE EDUCATION/TRAINING PROGRAM
Payer: MEDICAID

## 2024-05-09 PROCEDURE — 97110 THERAPEUTIC EXERCISES: CPT

## 2024-05-09 NOTE — PROGRESS NOTES
PHYSICAL THERAPY - DAILY TREATMENT NOTE (updated 3/23)      Date: 2024          Patient Name:  Shireen Hanna :  1990   Medical   Diagnosis:  Lumbar radiculopathy, chronic [M54.16] Treatment Diagnosis:  M54.41  LUMBAGO WITH SCIATICA, RIGHT SIDE, M54.42  LUMBAGO WITH SCIATICA, LEFT SIDE, and M54.59, G89.29  CHRONIC LOWER BACK PAIN    Referral Source:  Sahil Griffiths MD Insurance:   Payor: [facility] MEDICAID / Plan: [facility] [address] [facility] CARDINAL CARE / Product Type: *No Product type* /                     Patient  verified yes     Visit #   Current  / Total 2 24   Time   In / Out 905 947   Total Treatment Time 42   Total Timed Codes 42         SUBJECTIVE    Pain Level (0-10 scale): 3/10    Any medication changes, allergies to medications, adverse drug reactions, diagnosis change, or new procedure performed?: [x] No    [] Yes (see summary sheet for update)  Medications: Verified on Patient Summary List    Subjective functional status/changes:     Patient noted they have been doing alright, noted no soreness following previous treatment session and noted feeling optimistic that PT will help them.     OBJECTIVE      Therapeutic Procedures:  Tx Min Billable or 1:1 Min (if diff from Tx Min) Procedure, Rationale, Specifics   42  26072 Therapeutic Exercise (timed):  increase ROM, strength, coordination, balance, and proprioception to improve patient's ability to progress to PLOF and address remaining functional goals. (see flow sheet as applicable)     Details if applicable:                         42     Total Total             [x]  Patient Education billed concurrently with other procedures   [x] Review HEP    [] Progressed/Changed HEP, detail:    [] Other detail:         Other Objective/Functional Measures  NA    Pain Level at end of session (0-10 scale): 1/10      Assessment   Patient tolerated treatment session well today, able to perform new exercises and progressions without increasing pain

## 2024-05-13 ENCOUNTER — HOSPITAL ENCOUNTER (OUTPATIENT)
Facility: HOSPITAL | Age: 34
Setting detail: RECURRING SERIES
Discharge: HOME | End: 2024-05-16
Attending: STUDENT IN AN ORGANIZED HEALTH CARE EDUCATION/TRAINING PROGRAM
Payer: MEDICAID

## 2024-05-13 PROCEDURE — 97110 THERAPEUTIC EXERCISES: CPT | Performed by: PHYSICAL THERAPIST

## 2024-05-13 NOTE — PROGRESS NOTES
quicker).  Patient will continue to benefit from skilled PT / OT services to modify and progress therapeutic interventions, analyze and address functional mobility deficits, analyze and address ROM deficits, analyze and address strength deficits, analyze and address soft tissue restrictions, analyze and cue for proper movement patterns, and analyze and modify for postural abnormalities to address functional deficits and attain remaining goals.    Progress toward goals / Updated goals:  []  See Progress Note/Recertification    Short Term Goals: To be accomplished in 6-8 treatments.  1. The Pt will be independent and compliant with their HEP.  2. The Pt will report a 50% reduction in their pain with ADLs.  Long Term Goals: To be accomplished in 20-24 treatments.  The Pt will score the MCII on her FOTO survey demonstrating improved overall function (67 to 72 points).  The Pt will be able to stand >/= 2 hrs with 0-2/10 pain to allow the Pt to be able to perform standing ADLs with less pain or discomfort.  The Pt will report >/= 75% reduction in her symptoms to allow the Pt to be able to return to her PLOF with less pain or discomfort.         PLAN  Yes  Continue plan of care  Re-Cert Due: NA  [x]  Upgrade activities as tolerated  []  Discharge due to:  []  Other:      MERYL LAZAR, PT       5/13/2024       7:52 AM

## 2024-05-16 ENCOUNTER — HOSPITAL ENCOUNTER (OUTPATIENT)
Facility: HOSPITAL | Age: 34
Setting detail: RECURRING SERIES
Discharge: HOME | End: 2024-05-19
Attending: STUDENT IN AN ORGANIZED HEALTH CARE EDUCATION/TRAINING PROGRAM
Payer: MEDICAID

## 2024-05-16 PROCEDURE — 97110 THERAPEUTIC EXERCISES: CPT

## 2024-05-16 NOTE — PROGRESS NOTES
PHYSICAL THERAPY - DAILY TREATMENT NOTE (updated 3/23)      Date: 2024          Patient Name:  Shireen Hanna :  1990   Medical   Diagnosis:  Lumbar radiculopathy, chronic [M54.16] Treatment Diagnosis:  M54.41  LUMBAGO WITH SCIATICA, RIGHT SIDE, M54.42  LUMBAGO WITH SCIATICA, LEFT SIDE, and M54.59, G89.29  CHRONIC LOWER BACK PAIN    Referral Source:  Sahil Griffiths MD Insurance:   Payor: [facility] MEDICAID / Plan: [facility] [facility] CARDINAL CARE / Product Type: *No Product type* /                     Patient  verified yes     Visit #   Current  / Total 4 24   Time   In / Out 9:31 AM 10:15 AM   Total Treatment Time 44 minutes   Total Timed Codes 44 minutes         SUBJECTIVE    Pain Level (0-10 scale): 3/10    Any medication changes, allergies to medications, adverse drug reactions, diagnosis change, or new procedure performed?: [x] No    [] Yes (see summary sheet for update)  Medications: Verified on Patient Summary List    Subjective functional status/changes:     The Pt reports that the numbness and pain in her LE has dissipated.  She is having more pain in her lower back this morning, but denies any aggravating events and it had been feeling better before this morning. She is able to sleep on her back for longer periods and is no longer waking up secondary to pain.     OBJECTIVE      Therapeutic Procedures:  Tx Min Billable or 1:1 Min (if diff from Tx Min) Procedure, Rationale, Specifics   44  39007 Therapeutic Exercise (timed):  increase ROM, strength, coordination, balance, and proprioception to improve patient's ability to progress to PLOF and address remaining functional goals. (see flow sheet as applicable)     Details if applicable:                             44     Total Total             [x]  Patient Education billed concurrently with other procedures   [x] Review HEP    [] Progressed/Changed HEP, detail:    [] Other detail:         Other Objective/Functional Measures  None

## 2024-05-24 ENCOUNTER — HOSPITAL ENCOUNTER (OUTPATIENT)
Facility: HOSPITAL | Age: 34
Setting detail: RECURRING SERIES
Discharge: HOME | End: 2024-05-27
Attending: STUDENT IN AN ORGANIZED HEALTH CARE EDUCATION/TRAINING PROGRAM
Payer: MEDICAID

## 2024-05-24 PROCEDURE — 97110 THERAPEUTIC EXERCISES: CPT

## 2024-05-24 NOTE — PROGRESS NOTES
PHYSICAL THERAPY - DAILY TREATMENT NOTE (updated 3/23)      Date: 2024          Patient Name:  Shireen Hanna :  1990   Medical   Diagnosis:  Lumbar radiculopathy, chronic [M54.16] Treatment Diagnosis:  M54.41  LUMBAGO WITH SCIATICA, RIGHT SIDE, M54.42  LUMBAGO WITH SCIATICA, LEFT SIDE, and M54.59, G89.29  CHRONIC LOWER BACK PAIN    Referral Source:  Sahil Griffiths MD Insurance:   [facility]or: [facility] MEDICAID / Plan: [facility] [employer] [facility] CARDINAL CARE / Product Type: *No Product type* /                     Patient  verified yes     Visit #   Current  / Total 5 24   Time   In / Out 904 AM 952AM   Total Treatment Time 48 minutes   Total Timed Codes 48 minutes         SUBJECTIVE    Pain Level (0-10 scale): 3/10    Any medication changes, allergies to medications, adverse drug reactions, diagnosis change, or new procedure performed?: [x] No    [] Yes (see summary sheet for update)  Medications: Verified on Patient Summary List    Subjective functional status/changes:   Pt reports that she is doing well.  Still reports increased symptoms at the end of her day mainly in the buttocks and posterior knee    OBJECTIVE      Therapeutic Procedures:  Tx Min Billable or 1:1 Min (if diff from Tx Min) Procedure, Rationale, Specifics   48  77720 Therapeutic Exercise (timed):  increase ROM, strength, coordination, balance, and proprioception to improve patient's ability to progress to PLOF and address remaining functional goals. (see flow sheet as applicable)     Details if applicable:                             48     Total Total             [x]  Patient Education billed concurrently with other procedures   [x] Review HEP    [] Progressed/Changed HEP, detail:    [] Other detail:         Other Objective/Functional Measures  None noted    Pain Level at end of session (0-10 scale): 1/10      Assessment   Pt is able to complete all there-ex nicely.  She is performing all there-ex with proper form and having no

## 2024-05-31 ENCOUNTER — HOSPITAL ENCOUNTER (OUTPATIENT)
Facility: HOSPITAL | Age: 34
Setting detail: RECURRING SERIES
End: 2024-05-31
Attending: STUDENT IN AN ORGANIZED HEALTH CARE EDUCATION/TRAINING PROGRAM
Payer: MEDICAID

## 2024-05-31 PROCEDURE — 97110 THERAPEUTIC EXERCISES: CPT | Performed by: PHYSICAL THERAPIST

## 2024-05-31 NOTE — PROGRESS NOTES
PHYSICAL THERAPY - DAILY TREATMENT NOTE (updated 3/23)      Date: 2024          Patient Name:  Shireen Hanna :  1990   Medical   Diagnosis:  Lumbar radiculopathy, chronic [M54.16] Treatment Diagnosis:  M54.41  LUMBAGO WITH SCIATICA, RIGHT SIDE, M54.42  LUMBAGO WITH SCIATICA, LEFT SIDE, and M54.59, G89.29  CHRONIC LOWER BACK PAIN    Referral Source:  Sahil Griffiths MD Insurance:   Payor: [facility] MEDICAID / Plan: [facility] [employer] [facility] CARDINAL CARE / Product Type: *No Product type* /                     Patient  verified yes     Visit #   Current  / Total 6 24   Time   In / Out 9:00am 9:38am   Total Treatment Time 38   Total Timed Codes 38         SUBJECTIVE    Pain Level (0-10 scale): 0    Any medication changes, allergies to medications, adverse drug reactions, diagnosis change, or new procedure performed?: [x] No    [] Yes (see summary sheet for update)  Medications: Verified on Patient Summary List    Subjective functional status/changes:     The patient reports that she was sick over the weekend, so she didn't get to too many of the homework exercises.     OBJECTIVE      Therapeutic Procedures:  Tx Min Billable or 1:1 Min (if diff from Tx Min) Procedure, Rationale, Specifics   38  06361 Therapeutic Exercise (timed):  increase ROM, strength, coordination, balance, and proprioception to improve patient's ability to progress to PLOF and address remaining functional goals. (see flow sheet as applicable)     Details if applicable:                             38     Total Total             [x]  Patient Education billed concurrently with other procedures   [x] Review HEP    [] Progressed/Changed HEP, detail:    [] Other detail:         Other Objective/Functional Measures  None noted    Pain Level at end of session (0-10 scale): 0      Assessment   The patient progressed with new core therex and tolerance to exercises in general.  Patient will continue to benefit from skilled PT / OT services to

## 2024-06-03 ENCOUNTER — HOSPITAL ENCOUNTER (OUTPATIENT)
Facility: HOSPITAL | Age: 34
Setting detail: RECURRING SERIES
Discharge: HOME | End: 2024-06-06
Attending: STUDENT IN AN ORGANIZED HEALTH CARE EDUCATION/TRAINING PROGRAM
Payer: MEDICAID

## 2024-06-03 PROCEDURE — 97110 THERAPEUTIC EXERCISES: CPT

## 2024-06-03 SDOH — ECONOMIC STABILITY: FOOD INSECURITY: WITHIN THE PAST 12 MONTHS, YOU WORRIED THAT YOUR FOOD WOULD RUN OUT BEFORE YOU GOT MONEY TO BUY MORE.: NEVER TRUE

## 2024-06-03 SDOH — ECONOMIC STABILITY: FOOD INSECURITY: WITHIN THE PAST 12 MONTHS, THE FOOD YOU BOUGHT JUST DIDN'T LAST AND YOU DIDN'T HAVE MONEY TO GET MORE.: NEVER TRUE

## 2024-06-03 SDOH — ECONOMIC STABILITY: INCOME INSECURITY: HOW HARD IS IT FOR YOU TO PAY FOR THE VERY BASICS LIKE FOOD, HOUSING, MEDICAL CARE, AND HEATING?: NOT HARD AT ALL

## 2024-06-03 NOTE — PROGRESS NOTES
0/10      Assessment   The Pt tolerated the new spinal stability exercises well without increased pain and appropriate muscle activation noted.  She had complete resolution of her R hip/LBP at the end of the session.  Will progress with PT 1x/wk moving forward.  Patient will continue to benefit from skilled PT / OT services to modify and progress therapeutic interventions, analyze and address functional mobility deficits, analyze and address ROM deficits, analyze and address strength deficits, analyze and address soft tissue restrictions, analyze and cue for proper movement patterns, and analyze and modify for postural abnormalities to address functional deficits and attain remaining goals.    Progress toward goals / Updated goals:  []  See Progress Note/Recertification    Short Term Goals: To be accomplished in 6-8 treatments.  1. The Pt will be independent and compliant with their HEP- progressing  2. The Pt will report a 50% reduction in their pain with ADLs- progressing  Long Term Goals: To be accomplished in 20-24 treatments.  The Pt will score the MCII on her FOTO survey demonstrating improved overall function (67 to 72 points)- progressing  The Pt will be able to stand >/= 2 hrs with 0-2/10 pain to allow the Pt to be able to perform standing ADLs with less pain or discomfort- progressing  The Pt will report >/= 75% reduction in her symptoms to allow the Pt to be able to return to her PLOF with less pain or discomfort- progressing     PLAN  Yes  Continue plan of care  Re-Cert Due: NA  [x]  Upgrade activities as tolerated  []  Discharge due to:  []  Other:      Essie Lawrence, PT       6/3/2024       10:47 AM

## 2024-06-05 ENCOUNTER — APPOINTMENT (OUTPATIENT)
Facility: HOSPITAL | Age: 34
End: 2024-06-05
Attending: STUDENT IN AN ORGANIZED HEALTH CARE EDUCATION/TRAINING PROGRAM
Payer: MEDICAID

## 2024-06-06 ENCOUNTER — OFFICE VISIT (OUTPATIENT)
Age: 34
End: 2024-06-06
Payer: MEDICAID

## 2024-06-06 VITALS
WEIGHT: 188.27 LBS | RESPIRATION RATE: 18 BRPM | TEMPERATURE: 96.9 F | SYSTOLIC BLOOD PRESSURE: 110 MMHG | HEART RATE: 76 BPM | DIASTOLIC BLOOD PRESSURE: 75 MMHG | OXYGEN SATURATION: 98 % | BODY MASS INDEX: 32.14 KG/M2 | HEIGHT: 64 IN

## 2024-06-06 DIAGNOSIS — M54.16 LUMBAR RADICULOPATHY, CHRONIC: ICD-10-CM

## 2024-06-06 PROCEDURE — 99213 OFFICE O/P EST LOW 20 MIN: CPT | Performed by: STUDENT IN AN ORGANIZED HEALTH CARE EDUCATION/TRAINING PROGRAM

## 2024-06-06 RX ORDER — DICLOFENAC SODIUM 75 MG/1
75 TABLET, DELAYED RELEASE ORAL 2 TIMES DAILY PRN
Qty: 60 TABLET | Refills: 0
Start: 2024-06-06

## 2024-06-06 NOTE — PROGRESS NOTES
Chief Complaint   Patient presents with    Follow-up     2024 Lumbar radiculopathy, chronic     \"Have you been to the ER, urgent care clinic since your last visit?  Hospitalized since your last visit?\"    NO    “Have you seen or consulted any other health care providers outside of [facility] since your last visit?”    NO        “Have you had a pap smear?”      Yes 20[facility]           Vitals:    24 1014   BP: 110/75   Pulse: 76   Resp: 18   Temp: 96.9 °F (36.1 °C)   SpO2: 98%     Health Maintenance Due   Topic Date Due    Hepatitis B vaccine (1 of 3 - 3-dose series) Never done    Varicella vaccine (1 of 2 - 2-dose childhood series) Never done    Hepatitis C screen  Never done    Cervical cancer screen  Never done      The patient, Shireen Hanna, identity was verified by name and , pharmacy verified  Labs:Yes  Fasting:No

## 2024-06-10 ENCOUNTER — HOSPITAL ENCOUNTER (OUTPATIENT)
Facility: HOSPITAL | Age: 34
Setting detail: RECURRING SERIES
Discharge: HOME | End: 2024-06-13
Attending: STUDENT IN AN ORGANIZED HEALTH CARE EDUCATION/TRAINING PROGRAM
Payer: MEDICAID

## 2024-06-10 PROCEDURE — 97110 THERAPEUTIC EXERCISES: CPT

## 2024-06-10 NOTE — PROGRESS NOTES
PHYSICAL THERAPY - DAILY TREATMENT NOTE (updated 3/23)      Date: 6/10/2024          Patient Name:  Shireen Hanna :  1990   Medical   Diagnosis:  Lumbar radiculopathy, chronic [M54.16] Treatment Diagnosis:  M54.41  LUMBAGO WITH SCIATICA, RIGHT SIDE, M54.42  LUMBAGO WITH SCIATICA, LEFT SIDE, and M54.59, G89.29  CHRONIC LOWER BACK PAIN    Referral Source:  Sahil Griffiths MD Insurance:   Payor: [facility] MEDICAID / Plan: [facility] [employer] [facility] CARDINAL CARE / Product Type: *No Product type* /                     Patient  verified yes     Visit #   Current  / Total 8 24   Time   In / Out 10:36 AM 11:38 AM   Total Treatment Time 62 minutes   Total Timed Codes 62 minutes         SUBJECTIVE    Pain Level (0-10 scale): 0/10    Any medication changes, allergies to medications, adverse drug reactions, diagnosis change, or new procedure performed?: [x] No    [] Yes (see summary sheet for update)  Medications: Verified on Patient Summary List    Subjective functional status/changes:     The Pt denies any significant LBP or numbness in her thighs since last PT session. She is compliant with her HEP. She is sleeping well and not waking up in the evening secondary to pain. She was able to lift a case of water the other day and did have mild discomfort during the action, but once she stopped it dissipated. Her weight bearing tolerance is no longer limited secondary to back pain. Overall, she believes that she is 70% improved since beginning therapy.     OBJECTIVE      Therapeutic Procedures:  Tx Min Billable or 1:1 Min (if diff from Tx Min) Procedure, Rationale, Specifics   62  57299 Therapeutic Exercise (timed):  increase ROM, strength, coordination, balance, and proprioception to improve patient's ability to progress to PLOF and address remaining functional goals. (see flow sheet as applicable)     Details if applicable:                             62     Total Total             [x]  Patient Education billed 
progressing  The Pt will report >/= 75% reduction in her symptoms to allow the Pt to be able to return to her PLOF with less pain or discomfort- progressing    RECOMMENDATIONS FOR SKILLED THERAPY:  Recommend continued PT 1x/wk per her POC to allow the Pt to continue to progress towards her therapeutic goals and return to her PLOF with less pain or risk of further injury.        Essie Lawrence, PT       6/10/2024       12:07 PM    If you have any questions/comments please contact us directly at [phone].   Thank you for allowing us to assist in the care of your patient.

## 2024-06-12 ENCOUNTER — APPOINTMENT (OUTPATIENT)
Facility: HOSPITAL | Age: 34
End: 2024-06-12
Attending: STUDENT IN AN ORGANIZED HEALTH CARE EDUCATION/TRAINING PROGRAM
Payer: MEDICAID

## 2024-06-20 ENCOUNTER — APPOINTMENT (OUTPATIENT)
Facility: HOSPITAL | Age: 34
End: 2024-06-20
Attending: STUDENT IN AN ORGANIZED HEALTH CARE EDUCATION/TRAINING PROGRAM
Payer: MEDICAID

## 2024-06-21 ENCOUNTER — APPOINTMENT (OUTPATIENT)
Facility: HOSPITAL | Age: 34
End: 2024-06-21
Attending: STUDENT IN AN ORGANIZED HEALTH CARE EDUCATION/TRAINING PROGRAM
Payer: MEDICAID

## 2024-06-25 ENCOUNTER — HOSPITAL ENCOUNTER (OUTPATIENT)
Facility: HOSPITAL | Age: 34
Setting detail: RECURRING SERIES
Discharge: HOME | End: 2024-06-28
Attending: STUDENT IN AN ORGANIZED HEALTH CARE EDUCATION/TRAINING PROGRAM
Payer: MEDICAID

## 2024-06-25 PROCEDURE — 97110 THERAPEUTIC EXERCISES: CPT

## 2024-06-25 NOTE — PROGRESS NOTES
PHYSICAL THERAPY - DAILY TREATMENT NOTE (updated 3/23)      Date: 2024          Patient Name:  Shireen Hanna :  1990   Medical   Diagnosis:  Lumbar radiculopathy, chronic [M54.16] Treatment Diagnosis:  M54.41  LUMBAGO WITH SCIATICA, RIGHT SIDE, M54.42  LUMBAGO WITH SCIATICA, LEFT SIDE, and M54.59, G89.29  CHRONIC LOWER BACK PAIN    Referral Source:  Sahil Griffiths MD Insurance:   Payor: [facility] MEDICAID / Plan: [facility] CARDINAL CARE / Product Type: *No Product type* /                     Patient  verified yes     Visit #   Current  / Total 9 24   Time   In / Out 11:32 AM 12:15 PM   Total Treatment Time 43 minutes   Total Timed Codes 43 minutes         SUBJECTIVE    Pain Level (0-10 scale): 0/10    Any medication changes, allergies to medications, adverse drug reactions, diagnosis change, or new procedure performed?: [x] No    [] Yes (see summary sheet for update)  Medications: Verified on Patient Summary List    Subjective functional status/changes:     The Pt has not had any numbness over the last two week.  She denies any sharp pains, but does get a little soreness in increased activity. She is no longer limited with her weight bearing tolerance due to her back. She now able to lift a case of water without pain. Overall, she believes that she is 90% improved since beginning therapy.     OBJECTIVE      Therapeutic Procedures:  Tx Min Billable or 1:1 Min (if diff from Tx Min) Procedure, Rationale, Specifics   43  [id number] Therapeutic Exercise (timed):  increase ROM, strength, coordination, balance, and proprioception to improve patient's ability to progress to PLOF and address remaining functional goals. (see flow sheet as applicable)     Details if applicable:                             43     Total Total             [x]  Patient Education billed concurrently with other procedures   [x] Review HEP    [] Progressed/Changed HEP, detail:    [] Other detail:         Other

## 2024-06-25 NOTE — THERAPY DISCHARGE
[facility]  [address]  [place] [address]  Phone: [phone]   Fax: [phone]     DISCHARGE SUMMARY  Patient Name: Shireen Hanna : 1990   Treatment/Medical Diagnosis: Lumbar radiculopathy, chronic [M54.16]   Referral Source: Sahil Griffiths MD     Date of Initial Visit: 24 Attended Visits: 9 Missed Visits: 0     SUMMARY OF TREATMENT  The Pt was seen for 9 outpatient physical therapy sessions with chronic LBP with R-sided radicular pain/symptoms. The Pt's therapy program focused on improving her lumbar ROM and functional mobility, improving her hip and core strength and control, improving her balance and neuromuscular control, stretching her lower back and hip musculature, correcting her gait impairments, and improving her activity tolerance and endurance via therapeutic exercises. The Pt has been able to return to all ADLs without limiting pain or other radicular symptoms.  She no longer complains of tingling in her thighs and only feels mild soreness in her lower back if she has been on her feet for very long periods of day and more active than normal.  Her weight bearing tolerance is no longer limited secondary to pain.  She is able to lift a case of water without symptoms.  Overall, she believes that she is 90% improved since beginning therapy.    CURRENT STATUS  Short Term Goals: To be accomplished in 6-8 treatments.  1. The Pt will be independent and compliant with their HEP- met  2. The Pt will report a 50% reduction in their pain with ADLs- met  Long Term Goals: To be accomplished in 20-24 treatments.  The Pt will score the MCII on her FOTO survey demonstrating improved overall function (67 to 72 points)- met  The Pt will be able to stand >/= 2 hrs with 0-2/10 pain to allow the Pt to be able to perform standing ADLs with less pain or discomfort- met  The Pt will report >/= 75% reduction in

## 2024-09-05 ENCOUNTER — OFFICE VISIT (OUTPATIENT)
Age: 34
End: 2024-09-05
Payer: MEDICAID

## 2024-09-05 VITALS
WEIGHT: 192.24 LBS | OXYGEN SATURATION: 99 % | BODY MASS INDEX: 32.82 KG/M2 | RESPIRATION RATE: 17 BRPM | DIASTOLIC BLOOD PRESSURE: 79 MMHG | HEART RATE: 76 BPM | HEIGHT: 64 IN | SYSTOLIC BLOOD PRESSURE: 114 MMHG | TEMPERATURE: 98 F

## 2024-09-05 DIAGNOSIS — I83.813 VARICOSE VEINS OF BOTH LOWER EXTREMITIES WITH PAIN: Primary | ICD-10-CM

## 2024-09-05 DIAGNOSIS — Z23 FLU VACCINE NEED: ICD-10-CM

## 2024-09-05 PROCEDURE — 99213 OFFICE O/P EST LOW 20 MIN: CPT | Performed by: STUDENT IN AN ORGANIZED HEALTH CARE EDUCATION/TRAINING PROGRAM

## 2024-09-05 PROCEDURE — G0008 ADMIN INFLUENZA VIRUS VAC: HCPCS | Performed by: STUDENT IN AN ORGANIZED HEALTH CARE EDUCATION/TRAINING PROGRAM

## 2024-09-05 PROCEDURE — PBSHW INFLUENZA, FLUCELVAX TRIVALENT, (AGE 6 MO+) IM, PRESERVATIVE FREE, 0.5ML: Performed by: STUDENT IN AN ORGANIZED HEALTH CARE EDUCATION/TRAINING PROGRAM

## 2024-09-05 NOTE — PROGRESS NOTES
Chief Complaint   Patient presents with    Lumps Both Legs     X 1 month     Patient denies any injury or falls. Stated that the lump do itch sometime and become sore. Notice her veins ae showing up more in her legs. Has family of varicose veins.     \"Have you been to the ER, urgent care clinic since your last visit?  Hospitalized since your last visit?\"    NO    “Have you seen or consulted any other health care providers outside of [facility] since your last visit?”    NO     “Have you had a pap smear?”      Yes G[facility] 10/[facility]    Vitals:    24 0840   BP: 114/79   Pulse: 76   Resp: 17   Temp: 98 °F (36.7 °C)   SpO2: 99%      Health Maintenance Due   Topic Date Due    Varicella vaccine (1 of 2 - 13+ 2-dose series) Never done    Hepatitis C screen  Never done    Hepatitis B vaccine (1 of 3 - 19+ 3-dose series) Never done    Cervical cancer screen  Never done    COVID-19 Vaccine ( season) Never done        The patient, Shireen Hanna, identity was verified by name and .     After obtaining consent, and per orders of Dr. Griffiths, injection of   Influenza, FLUCELVAX Trivalent    given in Left deltoid by MAYUR MELLO MA. Patient instructed to remain in clinic for 20 minutes afterwards, and to report any adverse reaction to me immediately.   
extremities  Neurological: Alert and oriented X 3, normal strength and tone. Normal symmetric reflexes. Normal coordination and gait     Assessment/Plan  1. Varicose veins of both lower extremities with pain  Assessment & Plan:   Explained that this may be due to superficial venous reflux, deep venous reflux, or a combination of these.   -Recommend staring with nonoperative measures which includes limb elevation, exercise, and compression therapy.   -referred to vascular surgeon for further evaluation.    Orders:  -     [facility] - Shaggy Domínguez MD, Vascular Surgery, [place]  2. Flu vaccine need  -     Influenza, FLUCELVAX Trivalent, (age 6 mo+) IM, Preservative Free, 0.5mL           Follow up: RTC to clinic sooner should symptoms persist, worsen or fail to improve as anticipated.    We discussed the expected course, resolution and complications of the diagnosis(es) in detail.  Medication risks, benefits, costs, interactions, and alternatives were discussed as indicated.  I advised to contact the office if his condition worsens, changes or fails to improve as anticipated. Pt expressed understanding with the diagnosis(es) and plan. Patient understands that this encounter was a temporary measure, and the importance of further follow up and examination was emphasized.  Patient verbalized understanding.      Signed By: Sahil Griffiths MD     September 5, 2024

## 2024-09-05 NOTE — ASSESSMENT & PLAN NOTE
Explained that this may be due to superficial venous reflux, deep venous reflux, or a combination of these.   -Recommend staring with nonoperative measures which includes limb elevation, exercise, and compression therapy.   -referred to vascular surgeon for further evaluation.

## 2025-03-03 ENCOUNTER — OFFICE VISIT (OUTPATIENT)
Age: 35
End: 2025-03-03

## 2025-03-03 VITALS
BODY MASS INDEX: 32.27 KG/M2 | RESPIRATION RATE: 18 BRPM | TEMPERATURE: 98.1 F | HEIGHT: 64 IN | DIASTOLIC BLOOD PRESSURE: 85 MMHG | SYSTOLIC BLOOD PRESSURE: 124 MMHG | OXYGEN SATURATION: 96 % | WEIGHT: 189 LBS | HEART RATE: 75 BPM

## 2025-03-03 DIAGNOSIS — H65.03 BILATERAL ACUTE SEROUS OTITIS MEDIA, RECURRENCE NOT SPECIFIED: Primary | ICD-10-CM

## 2025-03-03 DIAGNOSIS — R09.82 POST-NASAL DRIP: ICD-10-CM

## 2025-03-03 RX ORDER — FLUTICASONE PROPIONATE 50 MCG
2 SPRAY, SUSPENSION (ML) NASAL DAILY
Qty: 16 G | Refills: 0 | Status: SHIPPED | OUTPATIENT
Start: 2025-03-03

## 2025-03-03 RX ORDER — CETIRIZINE HYDROCHLORIDE 10 MG/1
10 TABLET ORAL DAILY
Qty: 30 TABLET | Refills: 0 | Status: SHIPPED | OUTPATIENT
Start: 2025-03-03

## 2025-03-03 NOTE — PATIENT INSTRUCTIONS
Exam and history is consistent with bilateral serous otitis media and post-nasal drip  Flonase 1 squirt each nostril, once a day for at least the next 2 weeks  Mucinex Fastmax, Tylenol severe cold and flu, or DayQuil for symptomatic relief and decongestion  Zyrtec, Xyzal, Allegra, or Claritin for control of allergies for at least next 2 weeks  Ibuprofen 600 mg every 6 hours as needed and Tylenol 500 mg every 6 hours as needed for pain control.  Best if used in combination  Simple foods like chicken noodle soup, smoothies, hot tea with lemon and honey may also help  Steam inhalation, humidifier, warm compresses  Increase oral fluids to maintain hydration. Please drink at least 64 ounces of fluid daily  Avoid smoking and minimize contact with environmental irritants    Please follow up with your primary care provider if your symptoms last more than 10 days or worsen.    Please go immediately to the Emergency Department if you develop:  Fever higher than 102F (38.9C), sudden and severe pain in the face and head, trouble seeing or seeing double, trouble thinking clearly, swelling or redness around one or both eyes or a stiff neck

## 2025-03-25 DIAGNOSIS — H65.03 BILATERAL ACUTE SEROUS OTITIS MEDIA, RECURRENCE NOT SPECIFIED: ICD-10-CM

## 2025-03-25 DIAGNOSIS — R09.82 POST-NASAL DRIP: ICD-10-CM

## 2025-03-26 RX ORDER — FLUTICASONE PROPIONATE 50 MCG
2 SPRAY, SUSPENSION (ML) NASAL DAILY
Refills: 1 | OUTPATIENT
Start: 2025-03-26

## 2025-06-03 SDOH — ECONOMIC STABILITY: INCOME INSECURITY: IN THE LAST 12 MONTHS, WAS THERE A TIME WHEN YOU WERE NOT ABLE TO PAY THE MORTGAGE OR RENT ON TIME?: NO

## 2025-06-03 SDOH — ECONOMIC STABILITY: FOOD INSECURITY: WITHIN THE PAST 12 MONTHS, YOU WORRIED THAT YOUR FOOD WOULD RUN OUT BEFORE YOU GOT MONEY TO BUY MORE.: NEVER TRUE

## 2025-06-03 SDOH — ECONOMIC STABILITY: FOOD INSECURITY: WITHIN THE PAST 12 MONTHS, THE FOOD YOU BOUGHT JUST DIDN'T LAST AND YOU DIDN'T HAVE MONEY TO GET MORE.: NEVER TRUE

## 2025-06-03 SDOH — ECONOMIC STABILITY: TRANSPORTATION INSECURITY
IN THE PAST 12 MONTHS, HAS THE LACK OF TRANSPORTATION KEPT YOU FROM MEDICAL APPOINTMENTS OR FROM GETTING MEDICATIONS?: NO

## 2025-06-06 ENCOUNTER — OFFICE VISIT (OUTPATIENT)
Age: 35
End: 2025-06-06
Payer: COMMERCIAL

## 2025-06-06 VITALS
HEART RATE: 75 BPM | HEIGHT: 64 IN | RESPIRATION RATE: 17 BRPM | DIASTOLIC BLOOD PRESSURE: 73 MMHG | OXYGEN SATURATION: 99 % | TEMPERATURE: 97.8 F | SYSTOLIC BLOOD PRESSURE: 118 MMHG | BODY MASS INDEX: 31.99 KG/M2 | WEIGHT: 187.39 LBS

## 2025-06-06 DIAGNOSIS — E55.9 VITAMIN D DEFICIENCY: ICD-10-CM

## 2025-06-06 DIAGNOSIS — Z00.00 ENCOUNTER FOR WELL ADULT EXAM WITHOUT ABNORMAL FINDINGS: ICD-10-CM

## 2025-06-06 DIAGNOSIS — Z00.00 ENCOUNTER FOR WELL ADULT EXAM WITHOUT ABNORMAL FINDINGS: Primary | ICD-10-CM

## 2025-06-06 DIAGNOSIS — Z11.59 NEED FOR HEPATITIS C SCREENING TEST: ICD-10-CM

## 2025-06-06 LAB
25(OH)D3 SERPL-MCNC: 37.3 NG/ML (ref 30–100)
ANION GAP SERPL CALC-SCNC: 10 MMOL/L (ref 2–12)
BUN SERPL-MCNC: 13 MG/DL (ref 6–20)
BUN/CREAT SERPL: 16 (ref 12–20)
CALCIUM SERPL-MCNC: 8.9 MG/DL (ref 8.5–10.1)
CHLORIDE SERPL-SCNC: 107 MMOL/L (ref 97–108)
CHOLEST SERPL-MCNC: 171 MG/DL
CO2 SERPL-SCNC: 24 MMOL/L (ref 21–32)
CREAT SERPL-MCNC: 0.82 MG/DL (ref 0.55–1.02)
ERYTHROCYTE [DISTWIDTH] IN BLOOD BY AUTOMATED COUNT: 12.8 % (ref 11.5–14.5)
GLUCOSE SERPL-MCNC: 93 MG/DL (ref 65–100)
HCT VFR BLD AUTO: 41.8 % (ref 35–47)
HCV AB SER IA-ACNC: 0.08 INDEX
HCV AB SERPL QL IA: NONREACTIVE
HDLC SERPL-MCNC: 43 MG/DL
HDLC SERPL: 4 (ref 0–5)
HGB BLD-MCNC: 13.1 G/DL (ref 11.5–16)
LDLC SERPL CALC-MCNC: 96.2 MG/DL (ref 0–100)
MCH RBC QN AUTO: 26.8 PG (ref 26–34)
MCHC RBC AUTO-ENTMCNC: 31.3 G/DL (ref 30–36.5)
MCV RBC AUTO: 85.7 FL (ref 80–99)
NRBC # BLD: 0 K/UL (ref 0–0.01)
NRBC BLD-RTO: 0 PER 100 WBC
PLATELET # BLD AUTO: 226 K/UL (ref 150–400)
PMV BLD AUTO: 12.6 FL (ref 8.9–12.9)
POTASSIUM SERPL-SCNC: 4 MMOL/L (ref 3.5–5.1)
RBC # BLD AUTO: 4.88 M/UL (ref 3.8–5.2)
SODIUM SERPL-SCNC: 141 MMOL/L (ref 136–145)
TRIGL SERPL-MCNC: 159 MG/DL
VLDLC SERPL CALC-MCNC: 31.8 MG/DL
WBC # BLD AUTO: 5.8 K/UL (ref 3.6–11)

## 2025-06-06 PROCEDURE — 99395 PREV VISIT EST AGE 18-39: CPT | Performed by: STUDENT IN AN ORGANIZED HEALTH CARE EDUCATION/TRAINING PROGRAM

## 2025-06-06 ASSESSMENT — PATIENT HEALTH QUESTIONNAIRE - PHQ9
1. LITTLE INTEREST OR PLEASURE IN DOING THINGS: NOT AT ALL
2. FEELING DOWN, DEPRESSED OR HOPELESS: NOT AT ALL
SUM OF ALL RESPONSES TO PHQ QUESTIONS 1-9: 0

## 2025-06-06 NOTE — PROGRESS NOTES
[facility]  [address] [place] [address] [phone]    HPI:  Shireen Hanna is a 34 y.o. female. Presenting for her annual checkup.    Acute/chronic concerns:     No acute concerns.    Pertinent negatives include  no chest pain, no abdominal pain and no shortness of breath.     General Health Habits:  Physical activity: not much per patient  Smoking Hx: no   Alcohol Use: no  Occupation: stay home mom   and has 3 daughters (9yo, 10 yo and 11 yo)       Health Maintenance:    Follows with OB/GYN    Health Maintenance Due   Topic Date Due    Varicella vaccine (1 of 2 - 13+ 2-dose series) Never done    Hepatitis C screen  Never done    Hepatitis B vaccine (1 of 3 - 19+ 3-dose series) Never done    Cervical cancer screen  Never done    COVID-19 Vaccine (1 - 2024-25 season) Never done    Depression Screen  04/05/2025       Preventive Screenings:    PHQ-9 Total Score: 0 (6/6/2025 10:28 AM)        Current Outpatient Medications   Medication Sig Dispense Refill    fluticasone (FLONASE) 50 MCG/ACT nasal spray 2 sprays by Each Nostril route daily 16 g 0     No current facility-administered medications for this visit.       Allergies:   Patient has no known allergies.     Social History     Socioeconomic History    Marital status:      Spouse name: Not on file    Number of children: Not on file    Years of education: Not on file    Highest education level: Not on file   Occupational History    Not on file   Tobacco Use    Smoking status: Never    Smokeless tobacco: Never   Substance and Sexual Activity    Alcohol use: No     Alcohol/week: 0.0 standard drinks of alcohol    Drug use: No    Sexual activity: Not on file   Other Topics Concern    Not on file   Social History Narrative    Not on file     Social Drivers of Health     Financial Resource Strain: Low Risk  (6/3/2024)    Overall Financial Resource Strain (CARDIA)     Difficulty of Paying Living

## 2025-06-06 NOTE — PROGRESS NOTES
Chief Complaint   Patient presents with    Annual Exam     \"Have you been to the ER, urgent care clinic since your last visit?  Hospitalized since your last visit?\"      Yes  3/3/2025  [facility]  Bilateral acute serous otitis media, recurrence not specified     “Have you seen or consulted any other health care providers outside of [facility] since your last visit?”    NO        “Have you had a pap smear?”      Yes GYN 10/2024 Routine Yearly        Vitals:    25 1026   BP: 118/73   Pulse: 75   Resp: 17   Temp: 97.8 °F (36.6 °C)   TempSrc: Temporal   SpO2: 99%   Weight: 85 kg (187 lb 6.3 oz)   Height: 1.626 m (5' 4\")      Health Maintenance Due   Topic Date Due    Varicella vaccine (1 of 2 - 13+ 2-dose series) Never done    Hepatitis C screen  Never done    Hepatitis B vaccine (1 of 3 - 19+ 3-dose series) Never done    Cervical cancer screen  Never done    COVID-19 Vaccine (1 - 2024-25 season) Never done    Depression Screen  2025      The patient, Shireen Hanna, identity was verified by name and , pharmacy verified  Labs:Yes  Fasting:No

## 2025-06-15 ENCOUNTER — RESULTS FOLLOW-UP (OUTPATIENT)
Age: 35
End: 2025-06-15